# Patient Record
Sex: FEMALE | Race: OTHER | HISPANIC OR LATINO | URBAN - METROPOLITAN AREA
[De-identification: names, ages, dates, MRNs, and addresses within clinical notes are randomized per-mention and may not be internally consistent; named-entity substitution may affect disease eponyms.]

---

## 2017-08-12 ENCOUNTER — EMERGENCY (EMERGENCY)
Facility: HOSPITAL | Age: 82
LOS: 1 days | Discharge: HOME | End: 2017-08-12
Attending: EMERGENCY MEDICINE | Admitting: EMERGENCY MEDICINE
Payer: COMMERCIAL

## 2017-08-12 VITALS
HEIGHT: 56 IN | HEART RATE: 71 BPM | WEIGHT: 154.98 LBS | RESPIRATION RATE: 20 BRPM | SYSTOLIC BLOOD PRESSURE: 132 MMHG | DIASTOLIC BLOOD PRESSURE: 70 MMHG | OXYGEN SATURATION: 99 % | TEMPERATURE: 98 F

## 2017-08-12 DIAGNOSIS — Z79.2 LONG TERM (CURRENT) USE OF ANTIBIOTICS: ICD-10-CM

## 2017-08-12 DIAGNOSIS — M75.121 COMPLETE ROTATOR CUFF TEAR OR RUPTURE OF RIGHT SHOULDER, NOT SPECIFIED AS TRAUMATIC: Chronic | ICD-10-CM

## 2017-08-12 DIAGNOSIS — Z79.82 LONG TERM (CURRENT) USE OF ASPIRIN: ICD-10-CM

## 2017-08-12 DIAGNOSIS — Z90.710 ACQUIRED ABSENCE OF BOTH CERVIX AND UTERUS: Chronic | ICD-10-CM

## 2017-08-12 DIAGNOSIS — E78.5 HYPERLIPIDEMIA, UNSPECIFIED: ICD-10-CM

## 2017-08-12 DIAGNOSIS — I10 ESSENTIAL (PRIMARY) HYPERTENSION: ICD-10-CM

## 2017-08-12 DIAGNOSIS — B35.6 TINEA CRURIS: ICD-10-CM

## 2017-08-12 PROCEDURE — 99284 EMERGENCY DEPT VISIT MOD MDM: CPT

## 2017-08-12 PROCEDURE — 99283 EMERGENCY DEPT VISIT LOW MDM: CPT

## 2017-08-12 RX ORDER — NYSTATIN 500MM UNIT
10 POWDER (EA) MISCELLANEOUS
Qty: 1 | Refills: 0 | OUTPATIENT
Start: 2017-08-12 | End: 2017-08-26

## 2017-08-12 RX ORDER — MUPIROCIN 20 MG/G
1 OINTMENT TOPICAL
Qty: 1 | Refills: 0 | OUTPATIENT
Start: 2017-08-12 | End: 2017-08-26

## 2017-08-12 NOTE — ED PROVIDER NOTE - MEDICAL DECISION MAKING DETAILS
86 yr old female with hx of HTN, HLD and fungal infection presents to ed c/o rectal pruritis and pain due to frequent wiping and scratching causing excoriation since 2/2017.  pt seeing PMD and tried betaconazole, hydrocortisone without relieve.  no fever, no change in bowel, no n/v, no dysuria.      consistent with fungal infection of rectum with skin excoriation.  rx nystatin powder, bactroban.  f/u with pcp and derm.  left ambulatory

## 2017-08-12 NOTE — ED PROVIDER NOTE - PROGRESS NOTE DETAILS
[place]: findings consist with fungal infection with skin excoriation.  f/u with derm, rx nystatin powder to area tid x 14 days.  return instructions given.  encourage to avoid frequent scratching and wiping.  left ambulatory

## 2017-08-12 NOTE — ED PROVIDER NOTE - GASTROINTESTINAL, MLM
Abdomen soft, non-tender, no guarding. RECTUM- small external hemorrhoids, + skin excoriation without underlying cellulitis or yanira's. no abscess or blood at rectum.

## 2017-08-12 NOTE — ED PROVIDER NOTE - OBJECTIVE STATEMENT
86 yr old female with hx of HTN, HLD and fungal infection presents to ed c/o rectal pruritis and pain due to frequent wiping and scratching causing excoriation since 2/2017.  pt seeing PMD and tried betaconazole, hydrocortisone without relieve.  no fever, no change in bowel, no n/v, no dysuria.

## 2017-12-12 ENCOUNTER — EMERGENCY (EMERGENCY)
Facility: HOSPITAL | Age: 82
LOS: 1 days | Discharge: HOME | End: 2017-12-12
Attending: EMERGENCY MEDICINE
Payer: COMMERCIAL

## 2017-12-12 VITALS
HEIGHT: 56 IN | OXYGEN SATURATION: 98 % | WEIGHT: 154.98 LBS | DIASTOLIC BLOOD PRESSURE: 64 MMHG | SYSTOLIC BLOOD PRESSURE: 164 MMHG | TEMPERATURE: 98 F | RESPIRATION RATE: 20 BRPM | HEART RATE: 60 BPM

## 2017-12-12 DIAGNOSIS — Z90.710 ACQUIRED ABSENCE OF BOTH CERVIX AND UTERUS: Chronic | ICD-10-CM

## 2017-12-12 DIAGNOSIS — M75.121 COMPLETE ROTATOR CUFF TEAR OR RUPTURE OF RIGHT SHOULDER, NOT SPECIFIED AS TRAUMATIC: Chronic | ICD-10-CM

## 2017-12-12 LAB
APPEARANCE UR: CLEAR — SIGNIFICANT CHANGE UP
BACTERIA # UR AUTO: ABNORMAL /HPF
BILIRUB UR-MCNC: NEGATIVE — SIGNIFICANT CHANGE UP
COLOR SPEC: YELLOW — SIGNIFICANT CHANGE UP
DIFF PNL FLD: NEGATIVE — SIGNIFICANT CHANGE UP
EPI CELLS # UR: SIGNIFICANT CHANGE UP /HPF
GLUCOSE UR QL: NEGATIVE — SIGNIFICANT CHANGE UP
KETONES UR-MCNC: NEGATIVE — SIGNIFICANT CHANGE UP
LEUKOCYTE ESTERASE UR-ACNC: ABNORMAL
NITRITE UR-MCNC: NEGATIVE — SIGNIFICANT CHANGE UP
PH UR: 5 — SIGNIFICANT CHANGE UP (ref 5–8)
PROT UR-MCNC: NEGATIVE — SIGNIFICANT CHANGE UP
RBC CASTS # UR COMP ASSIST: SIGNIFICANT CHANGE UP /HPF (ref 0–2)
SP GR SPEC: 1.01 — SIGNIFICANT CHANGE UP (ref 1.01–1.02)
UROBILINOGEN FLD QL: NEGATIVE — SIGNIFICANT CHANGE UP
WBC UR QL: SIGNIFICANT CHANGE UP /HPF (ref 0–5)

## 2017-12-12 PROCEDURE — 81001 URINALYSIS AUTO W/SCOPE: CPT

## 2017-12-12 PROCEDURE — 99284 EMERGENCY DEPT VISIT MOD MDM: CPT

## 2017-12-12 PROCEDURE — 99283 EMERGENCY DEPT VISIT LOW MDM: CPT

## 2017-12-12 PROCEDURE — 87086 URINE CULTURE/COLONY COUNT: CPT

## 2017-12-12 RX ORDER — ESTRADIOL 4 UG/1
1 INSERT VAGINAL
Qty: 1 | Refills: 0 | OUTPATIENT
Start: 2017-12-12 | End: 2018-01-10

## 2017-12-12 RX ORDER — ESTROGENS, CONJUGATED 0.625 MG/G
1 CREAM WITH APPLICATOR VAGINAL
Qty: 1 | Refills: 0 | OUTPATIENT
Start: 2017-12-12 | End: 2018-01-01

## 2017-12-12 NOTE — ED PROVIDER NOTE - OBJECTIVE STATEMENT
85 y/o F pt with a significant PMHx of dyslipidemia, HTN and a significant PSHx of hysterectomy presents to the ED c/o vaginal itching and redness with burning urination x8 months. Pt reports that she thinks she has vaginal fungus. Pt states she saw her pmd in March with a normal exam; pt was given a cream which pt notes worsened pt's symptoms. Pt also reports intermittent LLQ pain x1 month; pt denies any current pain. Pt denies back pain, fever, chills vaginal discharge, sexual intercourse or any other complaints. NKDA.

## 2017-12-13 LAB
CULTURE RESULTS: SIGNIFICANT CHANGE UP
SPECIMEN SOURCE: SIGNIFICANT CHANGE UP

## 2017-12-18 ENCOUNTER — RESULT REVIEW (OUTPATIENT)
Age: 82
End: 2017-12-18

## 2018-09-06 ENCOUNTER — INPATIENT (INPATIENT)
Facility: HOSPITAL | Age: 83
LOS: 4 days | Discharge: HOME | DRG: 195 | End: 2018-09-11
Attending: INTERNAL MEDICINE | Admitting: INTERNAL MEDICINE
Payer: COMMERCIAL

## 2018-09-06 VITALS
WEIGHT: 154.98 LBS | HEIGHT: 56 IN | DIASTOLIC BLOOD PRESSURE: 70 MMHG | HEART RATE: 86 BPM | RESPIRATION RATE: 16 BRPM | TEMPERATURE: 100 F | OXYGEN SATURATION: 95 % | SYSTOLIC BLOOD PRESSURE: 140 MMHG

## 2018-09-06 DIAGNOSIS — J18.9 PNEUMONIA, UNSPECIFIED ORGANISM: ICD-10-CM

## 2018-09-06 DIAGNOSIS — Z29.9 ENCOUNTER FOR PROPHYLACTIC MEASURES, UNSPECIFIED: ICD-10-CM

## 2018-09-06 DIAGNOSIS — I10 ESSENTIAL (PRIMARY) HYPERTENSION: ICD-10-CM

## 2018-09-06 DIAGNOSIS — M75.121 COMPLETE ROTATOR CUFF TEAR OR RUPTURE OF RIGHT SHOULDER, NOT SPECIFIED AS TRAUMATIC: Chronic | ICD-10-CM

## 2018-09-06 DIAGNOSIS — G25.81 RESTLESS LEGS SYNDROME: ICD-10-CM

## 2018-09-06 DIAGNOSIS — E78.5 HYPERLIPIDEMIA, UNSPECIFIED: ICD-10-CM

## 2018-09-06 DIAGNOSIS — Z71.89 OTHER SPECIFIED COUNSELING: ICD-10-CM

## 2018-09-06 DIAGNOSIS — Z90.710 ACQUIRED ABSENCE OF BOTH CERVIX AND UTERUS: Chronic | ICD-10-CM

## 2018-09-06 LAB
ANION GAP SERPL CALC-SCNC: 7 MMOL/L — SIGNIFICANT CHANGE UP (ref 5–17)
BASOPHILS # BLD AUTO: 0 K/UL — SIGNIFICANT CHANGE UP (ref 0–0.2)
BASOPHILS NFR BLD AUTO: 0.4 % — SIGNIFICANT CHANGE UP (ref 0–2)
BUN SERPL-MCNC: 27 MG/DL — HIGH (ref 7–18)
CALCIUM SERPL-MCNC: 8.5 MG/DL — SIGNIFICANT CHANGE UP (ref 8.4–10.5)
CHLORIDE SERPL-SCNC: 112 MMOL/L — HIGH (ref 96–108)
CO2 SERPL-SCNC: 25 MMOL/L — SIGNIFICANT CHANGE UP (ref 22–31)
CREAT SERPL-MCNC: 0.93 MG/DL — SIGNIFICANT CHANGE UP (ref 0.5–1.3)
EOSINOPHIL # BLD AUTO: 0.1 K/UL — SIGNIFICANT CHANGE UP (ref 0–0.5)
EOSINOPHIL NFR BLD AUTO: 1 % — SIGNIFICANT CHANGE UP (ref 0–6)
GLUCOSE SERPL-MCNC: 105 MG/DL — HIGH (ref 70–99)
HCT VFR BLD CALC: 38.8 % — SIGNIFICANT CHANGE UP (ref 34.5–45)
HGB BLD-MCNC: 12.4 G/DL — SIGNIFICANT CHANGE UP (ref 11.5–15.5)
LACTATE SERPL-SCNC: 1.4 MMOL/L — SIGNIFICANT CHANGE UP (ref 0.7–2)
LYMPHOCYTES # BLD AUTO: 1.2 K/UL — SIGNIFICANT CHANGE UP (ref 1–3.3)
LYMPHOCYTES # BLD AUTO: 11.1 % — LOW (ref 13–44)
MCHC RBC-ENTMCNC: 30 PG — SIGNIFICANT CHANGE UP (ref 27–34)
MCHC RBC-ENTMCNC: 32.1 GM/DL — SIGNIFICANT CHANGE UP (ref 32–36)
MCV RBC AUTO: 93.6 FL — SIGNIFICANT CHANGE UP (ref 80–100)
MONOCYTES # BLD AUTO: 0.3 K/UL — SIGNIFICANT CHANGE UP (ref 0–0.9)
MONOCYTES NFR BLD AUTO: 2.8 % — SIGNIFICANT CHANGE UP (ref 2–14)
NEUTROPHILS # BLD AUTO: 9.3 K/UL — HIGH (ref 1.8–7.4)
NEUTROPHILS NFR BLD AUTO: 84.7 % — HIGH (ref 43–77)
NT-PROBNP SERPL-SCNC: 317 PG/ML — SIGNIFICANT CHANGE UP (ref 0–450)
PLATELET # BLD AUTO: 199 K/UL — SIGNIFICANT CHANGE UP (ref 150–400)
POTASSIUM SERPL-MCNC: 3.9 MMOL/L — SIGNIFICANT CHANGE UP (ref 3.5–5.3)
POTASSIUM SERPL-SCNC: 3.9 MMOL/L — SIGNIFICANT CHANGE UP (ref 3.5–5.3)
RAPID RVP RESULT: SIGNIFICANT CHANGE UP
RBC # BLD: 4.14 M/UL — SIGNIFICANT CHANGE UP (ref 3.8–5.2)
RBC # FLD: 13.6 % — SIGNIFICANT CHANGE UP (ref 10.3–14.5)
SODIUM SERPL-SCNC: 144 MMOL/L — SIGNIFICANT CHANGE UP (ref 135–145)
TROPONIN I SERPL-MCNC: <0.015 NG/ML — SIGNIFICANT CHANGE UP (ref 0–0.04)
WBC # BLD: 11 K/UL — HIGH (ref 3.8–10.5)
WBC # FLD AUTO: 11 K/UL — HIGH (ref 3.8–10.5)

## 2018-09-06 PROCEDURE — 99285 EMERGENCY DEPT VISIT HI MDM: CPT | Mod: 25

## 2018-09-06 PROCEDURE — 99223 1ST HOSP IP/OBS HIGH 75: CPT | Mod: GC

## 2018-09-06 PROCEDURE — 93010 ELECTROCARDIOGRAM REPORT: CPT

## 2018-09-06 PROCEDURE — 71045 X-RAY EXAM CHEST 1 VIEW: CPT | Mod: 26

## 2018-09-06 RX ORDER — AZITHROMYCIN 500 MG/1
500 TABLET, FILM COATED ORAL EVERY 24 HOURS
Qty: 0 | Refills: 0 | Status: DISCONTINUED | OUTPATIENT
Start: 2018-09-07 | End: 2018-09-11

## 2018-09-06 RX ORDER — OMEPRAZOLE 10 MG/1
1 CAPSULE, DELAYED RELEASE ORAL
Qty: 0 | Refills: 0 | COMMUNITY

## 2018-09-06 RX ORDER — PANTOPRAZOLE SODIUM 20 MG/1
40 TABLET, DELAYED RELEASE ORAL
Qty: 0 | Refills: 0 | Status: DISCONTINUED | OUTPATIENT
Start: 2018-09-06 | End: 2018-09-11

## 2018-09-06 RX ORDER — ATORVASTATIN CALCIUM 80 MG/1
10 TABLET, FILM COATED ORAL AT BEDTIME
Qty: 0 | Refills: 0 | Status: DISCONTINUED | OUTPATIENT
Start: 2018-09-06 | End: 2018-09-11

## 2018-09-06 RX ORDER — ROPINIROLE 8 MG/1
0.5 TABLET, FILM COATED, EXTENDED RELEASE ORAL DAILY
Qty: 0 | Refills: 0 | Status: DISCONTINUED | OUTPATIENT
Start: 2018-09-06 | End: 2018-09-11

## 2018-09-06 RX ORDER — ROPINIROLE 8 MG/1
1 TABLET, FILM COATED, EXTENDED RELEASE ORAL
Qty: 0 | Refills: 0 | COMMUNITY

## 2018-09-06 RX ORDER — CEFTRIAXONE 500 MG/1
1 INJECTION, POWDER, FOR SOLUTION INTRAMUSCULAR; INTRAVENOUS ONCE
Qty: 0 | Refills: 0 | Status: COMPLETED | OUTPATIENT
Start: 2018-09-06 | End: 2018-09-06

## 2018-09-06 RX ORDER — SODIUM CHLORIDE 9 MG/ML
3 INJECTION INTRAMUSCULAR; INTRAVENOUS; SUBCUTANEOUS ONCE
Qty: 0 | Refills: 0 | Status: COMPLETED | OUTPATIENT
Start: 2018-09-06 | End: 2018-09-06

## 2018-09-06 RX ORDER — ALBUTEROL 90 UG/1
2.5 AEROSOL, METERED ORAL ONCE
Qty: 0 | Refills: 0 | Status: COMPLETED | OUTPATIENT
Start: 2018-09-06 | End: 2018-09-06

## 2018-09-06 RX ORDER — AZITHROMYCIN 500 MG/1
500 TABLET, FILM COATED ORAL ONCE
Qty: 0 | Refills: 0 | Status: COMPLETED | OUTPATIENT
Start: 2018-09-06 | End: 2018-09-06

## 2018-09-06 RX ORDER — INFLUENZA VIRUS VACCINE 15; 15; 15; 15 UG/.5ML; UG/.5ML; UG/.5ML; UG/.5ML
0.5 SUSPENSION INTRAMUSCULAR ONCE
Qty: 0 | Refills: 0 | Status: COMPLETED | OUTPATIENT
Start: 2018-09-06 | End: 2018-09-11

## 2018-09-06 RX ORDER — ACETAMINOPHEN 500 MG
650 TABLET ORAL ONCE
Qty: 0 | Refills: 0 | Status: COMPLETED | OUTPATIENT
Start: 2018-09-06 | End: 2018-09-06

## 2018-09-06 RX ORDER — ENOXAPARIN SODIUM 100 MG/ML
40 INJECTION SUBCUTANEOUS DAILY
Qty: 0 | Refills: 0 | Status: DISCONTINUED | OUTPATIENT
Start: 2018-09-06 | End: 2018-09-11

## 2018-09-06 RX ORDER — CEFTRIAXONE 500 MG/1
1 INJECTION, POWDER, FOR SOLUTION INTRAMUSCULAR; INTRAVENOUS EVERY 24 HOURS
Qty: 0 | Refills: 0 | Status: DISCONTINUED | OUTPATIENT
Start: 2018-09-07 | End: 2018-09-11

## 2018-09-06 RX ADMIN — SODIUM CHLORIDE 3 MILLILITER(S): 9 INJECTION INTRAMUSCULAR; INTRAVENOUS; SUBCUTANEOUS at 07:42

## 2018-09-06 RX ADMIN — AZITHROMYCIN 250 MILLIGRAM(S): 500 TABLET, FILM COATED ORAL at 08:22

## 2018-09-06 RX ADMIN — ROPINIROLE 0.5 MILLIGRAM(S): 8 TABLET, FILM COATED, EXTENDED RELEASE ORAL at 12:49

## 2018-09-06 RX ADMIN — Medication 650 MILLIGRAM(S): at 08:22

## 2018-09-06 RX ADMIN — ALBUTEROL 2.5 MILLIGRAM(S): 90 AEROSOL, METERED ORAL at 08:22

## 2018-09-06 RX ADMIN — CEFTRIAXONE 100 GRAM(S): 500 INJECTION, POWDER, FOR SOLUTION INTRAMUSCULAR; INTRAVENOUS at 08:23

## 2018-09-06 RX ADMIN — ATORVASTATIN CALCIUM 10 MILLIGRAM(S): 80 TABLET, FILM COATED ORAL at 22:06

## 2018-09-06 RX ADMIN — ENOXAPARIN SODIUM 40 MILLIGRAM(S): 100 INJECTION SUBCUTANEOUS at 11:36

## 2018-09-06 NOTE — H&P ADULT - HISTORY OF PRESENT ILLNESS
87F walks independent lives in daughter Amirah([phone]) in family house with PMH of restless leg syndrome (on Ropinirole), HTN (was on lisinopril which was discontinued likely 2/2 chronic dry cough), HLD presented with SOB and COLVIN started this morning. Patient was fine last night but she woke up around 5 as she was not feeling good. When she tried to get up form bed she started getting SOB, so she went to her daughter. When daughter saw her she was gasping for air so she called ambulance. As per daughter by the time ambulance came her respiration significantly improved. She reports having dry cough for last 6 months and lisinopril was likely stopped for same. Denies chest pain, abdominal pain, palpitations, dizziness, N/V/D, headache.

## 2018-09-06 NOTE — ED ADULT NURSE NOTE - OBJECTIVE STATEMENT
Pt is aox3 came to er accompanied by her daughter via ems. with c/o sob and cough. as per daughter pt sob was worse at midnight but is much better now. pt was seen by Attending, labs pending. culture and lactate also pending.

## 2018-09-06 NOTE — H&P ADULT - PROBLEM SELECTOR PLAN 5
IMPROVE VTE Individual Risk Assessment          RISK                                                          Points  [  ] Previous VTE                                                3  [  ] Thrombophilia                                             2  [  ] Lower limb paralysis                                   2        (unable to hold up >15 seconds)    [  ] Current Cancer                                             2         (within 6 months)  [ x ] Immobilization > 24 hrs                              1  [  ] ICU/CCU stay > 24 hours                             1  [ x ] Age > 60                                                         1    IMPROVE VTE Score: 2  will start on lovenox   no need for GI prophylaxis

## 2018-09-06 NOTE — H&P ADULT - NSHPSOCIALHISTORY_GEN_ALL_CORE
Denies use of alcohol, cigarettes and recreational drugs. Denies use of alcohol and recreational drugs.  smokes for few year - quit 50 years ago  lives in family home with daughter Amirah  walks independently

## 2018-09-06 NOTE — H&P ADULT - PROBLEM SELECTOR PLAN 2
was on lisinopril   which was taken off 2/2 cough   will continue monitoring it - add medication if needed

## 2018-09-06 NOTE — PATIENT PROFILE ADULT. - LANGUAGE ASSISTANCE NEEDED
SPEAKS SOME English. Understands to request  as needed/No-Patient/Caregiver offered and refused free interpretation services.

## 2018-09-06 NOTE — H&P ADULT - PROBLEM SELECTOR PLAN 4
IMPROVE VTE Individual Risk Assessment          RISK                                                          Points  [  ] Previous VTE                                                3  [  ] Thrombophilia                                             2  [  ] Lower limb paralysis                                   2        (unable to hold up >15 seconds)    [  ] Current Cancer                                             2         (within 6 months)  [ x ] Immobilization > 24 hrs                              1  [  ] ICU/CCU stay > 24 hours                             1  [ x ] Age > 60                                                         1    IMPROVE VTE Score: 2  will start on lovenox   no need for GI prophylaxis continue home medication ropinirole

## 2018-09-06 NOTE — H&P ADULT - ASSESSMENT
87F walks independent lives in daughter Amirah([phone]) in family house with PMH of restless leg syndrome (on Ropinirole), HTN (was on lisinopril which was discontinued likely 2/2 chronic dry cough), HLD presented with SOB and COLVIN started this morning. Patient was fine last night but she woke up around 5 as she was not feeling good. When she tried to get up form bed she started getting SOB, so she went to her daughter. When daughter saw her she was gasping for air so she called ambulance. As per daughter by the time ambulance came her respiration significantly improved. She reports having dry cough for last 6 months and lisinopril was likely stopped for same. Denies chest pain, abdominal pain, palpitations, dizziness, N/V/D, headache.      In ED: vitals stable  labs pertinent for WBC 11.0  stat dose of rocephin and zithromax was given   supportive care with tylenol and duoneb   EKG NSR -no ST-T wave changes   Chest Xray: right upper and base infiltrate

## 2018-09-06 NOTE — ED ADULT TRIAGE NOTE - CHIEF COMPLAINT QUOTE
BIBA c/o cough and shortness of breath since midnight, pt's daughter states pt feels warm and was more in distress last night but presently breathing at normal rate

## 2018-09-06 NOTE — H&P ADULT - ATTENDING COMMENTS
Patient seen/evaluated at bedside in ED holding 9/6/2018. I agree with the resident h&P note/outlined plan of care. My independent findings and conclusions are documented.    Briefly, this is y/o female who lives with daughters presents with  Pt denies weight loss, night sweats, recent travel, hemoptysis, nausea/vomiting. (+) fatigue     vials reviewed  AAOx3 NAD lying comfortably flat in bed  neck supple  S1S2 RRR  CTAb/l no accessory muscle use  soft NT, ND, + BS   trace b/l lower extremity edema  no cyanosis/clubbing    1. PNA  somewhat atypical symptoms in light of prolonged history o  though there may have been Patient seen/evaluated at bedside in ED holding 9/6/2018. I agree with the resident h&P note/outlined plan of care. My independent findings and conclusions are documented.    Briefly, this is y/o female who lives with daughters presents with  Pt denies weight loss, night sweats, recent travel, hemoptysis, nausea/vomiting. (+) fatigue     pmhx/meds/all/ros/famhx/sochx as per details of the H&P    vials reviewed  AAOx3 NAD lying comfortably flat in bed  neck supple  S1S2 RRR  right sided rhonchi, no accessory muscle use  soft NT, ND, + BS   trace b/l lower extremity edema  no cyanosis/clubbing    1. PNA: RuL/RLL infiltrate  somewhat atypical symptoms in light of prolonged history of cough though there may have been a potential drug effect. No weight loss, night sweats/hemoptysis/ recent travel, chest/back pain. Smoking history was minimal and remote. 02 saturations are appropriate  -ceftriaxone/azithromycin  -quantiferon gold  -send procalcitonin  -supportive management w/ anti-tussive  -RVP negative, urine legionella    2. HTN  3. Restless leg syndrome  4. HLD    c/w ropinerole/statin

## 2018-09-06 NOTE — H&P ADULT - PROBLEM SELECTOR PLAN 1
presented with chronic cough and acute SOB/COLVIN starting this AM   chest xray : right upper lobe and base pneumonia   P/E: diffuse rhonchi over right side   follow blood culture, legionella antigen and RVP  stat dose of rocphin and zithromax in ED presented with chronic cough and acute SOB/COLVIN starting this AM   chest xray : right upper lobe and base pneumonia   P/E: diffuse rhonchi over right side   follow blood culture, legionella antigen and RVP  stat dose of rocphin and zithromax in ED  will continue with rocphin and zithromax

## 2018-09-06 NOTE — ED PROVIDER NOTE - MEDICAL DECISION MAKING DETAILS
86 yo F with left sided infiltrate persistent coughing, shortness of breath  and now febrile. MAR endorsed. Dr. Carroll was paged. Pt agrees with admission. I had a detailed discussion with the patient and/or guardian regarding the historical points, exam findings, and any diagnostic results supporting the admit diagnosis.

## 2018-09-06 NOTE — ED PROVIDER NOTE - OBJECTIVE STATEMENT
Pt with constant coughing x 6 months, +SOB last night, + exertional dyspnea,, no chest pain.  No reported fever.  Meds Hydroxyzine, Lovastatin, Antivert, Omeprazole, Ropinirole.

## 2018-09-06 NOTE — H&P ADULT - NSHPPHYSICALEXAM_GEN_ALL_CORE
· Constitutional	Well-developed, well nourished  · Eyes	EOMI; PERRL; no drainage or redness  · ENMT	No oral lesions; no gross abnormalities  · Neck	No bruits; no thyromegaly or nodules   · Back	No deformity or limitation of movement   · Respiratory	Breath Sounds equal & clear to percussion & auscultation, no accessory muscle use  · Cardiovascular	Regular rate & rhythm, normal S1, S2; no murmurs, gallops or rubs; no S3, S4  · Gastrointestinal	Soft, non-tender, no hepatosplenomegaly, normal bowel sounds  · Extremities	No cyanosis, clubbing or edema   · Neurological	Alert & oriented; no sensory, motor or coordination deficits, normal reflexes  · Musculoskeletal	No joint pain, swelling or deformity; no limitation of movement · Constitutional	Well-developed, well nourished  · Eyes	EOMI; PERRL; no drainage or redness  · ENMT	No oral lesions; no gross abnormalities  · Neck	No bruits; no thyromegaly or nodules   · Back	No deformity or limitation of movement   · Respiratory	right lobe diffuse rhonchi noted clear on auscultation on left side   · Cardiovascular	Regular rate & rhythm, normal S1, S2; no murmurs, gallops or rubs; no S3, S4  · Gastrointestinal	Soft, non-tender, no hepatosplenomegaly, normal bowel sounds  · Extremities	No cyanosis, clubbing or edema   · Neurological	Alert & oriented; no sensory, motor or coordination deficits, normal reflexes  · Musculoskeletal	No joint pain, swelling or deformity; no limitation of movement

## 2018-09-06 NOTE — H&P ADULT - NSHPLABSRESULTS_GEN_ALL_CORE
ICU Vital Signs Last 24 Hrs  T(C): 36.9 (06 Sep 2018 10:48), Max: 37.8 (06 Sep 2018 07:42)  T(F): 98.4 (06 Sep 2018 10:48), Max: 100.1 (06 Sep 2018 07:42)  HR: 68 (06 Sep 2018 10:48) (68 - 86)  BP: 129/58 (06 Sep 2018 10:48) (129/58 - 182/83)  RR: 22 (06 Sep 2018 10:48) (16 - 22)  SpO2: 96% (06 Sep 2018 10:48) (95% - 100%)                              12.4   11.0  )-----------( 199      ( 06 Sep 2018 07:09 )             38.8       09-06    144  |  112<H>  |  27<H>  ----------------------------<  105<H>  3.9   |  25  |  0.93    Ca    8.5      06 Sep 2018 07:09    Lactate Trend  09-06 @ 07:09 Lactate:1.4       CARDIAC MARKERS ( 06 Sep 2018 07:09 )  <0.015 ng/mL / x     / x     / x     / x          CAPILLARY BLOOD GLUCOSE      POCT Blood Glucose.: 99 mg/dL (06 Sep 2018 06:49)

## 2018-09-06 NOTE — ED ADULT NURSE NOTE - NSIMPLEMENTINTERV_GEN_ALL_ED
Implemented All Universal Safety Interventions:  [place] to call system. Call bell, personal items and telephone within reach. Instruct patient to call for assistance. Room bathroom lighting operational. Non-slip footwear when patient is off stretcher. Physically safe environment: no spills, clutter or unnecessary equipment. Stretcher in lowest position, wheels locked, appropriate side rails in place.

## 2018-09-07 LAB
24R-OH-CALCIDIOL SERPL-MCNC: 27.3 NG/ML — LOW (ref 30–80)
ANION GAP SERPL CALC-SCNC: 8 MMOL/L — SIGNIFICANT CHANGE UP (ref 5–17)
BASOPHILS # BLD AUTO: 0 K/UL — SIGNIFICANT CHANGE UP (ref 0–0.2)
BASOPHILS NFR BLD AUTO: 0.5 % — SIGNIFICANT CHANGE UP (ref 0–2)
BUN SERPL-MCNC: 15 MG/DL — SIGNIFICANT CHANGE UP (ref 7–18)
CALCIUM SERPL-MCNC: 8.2 MG/DL — LOW (ref 8.4–10.5)
CHLORIDE SERPL-SCNC: 113 MMOL/L — HIGH (ref 96–108)
CHOLEST SERPL-MCNC: 139 MG/DL — SIGNIFICANT CHANGE UP (ref 10–199)
CO2 SERPL-SCNC: 23 MMOL/L — SIGNIFICANT CHANGE UP (ref 22–31)
CREAT SERPL-MCNC: 0.82 MG/DL — SIGNIFICANT CHANGE UP (ref 0.5–1.3)
EOSINOPHIL # BLD AUTO: 0.3 K/UL — SIGNIFICANT CHANGE UP (ref 0–0.5)
EOSINOPHIL NFR BLD AUTO: 3.2 % — SIGNIFICANT CHANGE UP (ref 0–6)
FOLATE SERPL-MCNC: >20 NG/ML — SIGNIFICANT CHANGE UP
GLUCOSE SERPL-MCNC: 81 MG/DL — SIGNIFICANT CHANGE UP (ref 70–99)
HBA1C BLD-MCNC: 5.3 % — SIGNIFICANT CHANGE UP (ref 4–5.6)
HCT VFR BLD CALC: 32.7 % — LOW (ref 34.5–45)
HDLC SERPL-MCNC: 49 MG/DL — LOW
HGB BLD-MCNC: 10.3 G/DL — LOW (ref 11.5–15.5)
LEGIONELLA AG UR QL: NEGATIVE — SIGNIFICANT CHANGE UP
LIPID PNL WITH DIRECT LDL SERPL: 75 MG/DL — SIGNIFICANT CHANGE UP
LYMPHOCYTES # BLD AUTO: 2.3 K/UL — SIGNIFICANT CHANGE UP (ref 1–3.3)
LYMPHOCYTES # BLD AUTO: 22 % — SIGNIFICANT CHANGE UP (ref 13–44)
MAGNESIUM SERPL-MCNC: 2.1 MG/DL — SIGNIFICANT CHANGE UP (ref 1.6–2.6)
MCHC RBC-ENTMCNC: 29.3 PG — SIGNIFICANT CHANGE UP (ref 27–34)
MCHC RBC-ENTMCNC: 31.6 GM/DL — LOW (ref 32–36)
MCV RBC AUTO: 92.8 FL — SIGNIFICANT CHANGE UP (ref 80–100)
MONOCYTES # BLD AUTO: 0.4 K/UL — SIGNIFICANT CHANGE UP (ref 0–0.9)
MONOCYTES NFR BLD AUTO: 4 % — SIGNIFICANT CHANGE UP (ref 2–14)
NEUTROPHILS # BLD AUTO: 7.2 K/UL — SIGNIFICANT CHANGE UP (ref 1.8–7.4)
NEUTROPHILS NFR BLD AUTO: 70.4 % — SIGNIFICANT CHANGE UP (ref 43–77)
PHOSPHATE SERPL-MCNC: 2.5 MG/DL — SIGNIFICANT CHANGE UP (ref 2.5–4.5)
PLATELET # BLD AUTO: 164 K/UL — SIGNIFICANT CHANGE UP (ref 150–400)
POTASSIUM SERPL-MCNC: 3.5 MMOL/L — SIGNIFICANT CHANGE UP (ref 3.5–5.3)
POTASSIUM SERPL-SCNC: 3.5 MMOL/L — SIGNIFICANT CHANGE UP (ref 3.5–5.3)
PROCALCITONIN SERPL-MCNC: 0.9 NG/ML — HIGH (ref 0.02–0.1)
RBC # BLD: 3.53 M/UL — LOW (ref 3.8–5.2)
RBC # FLD: 13.2 % — SIGNIFICANT CHANGE UP (ref 10.3–14.5)
SODIUM SERPL-SCNC: 144 MMOL/L — SIGNIFICANT CHANGE UP (ref 135–145)
TOTAL CHOLESTEROL/HDL RATIO MEASUREMENT: 2.8 RATIO — LOW (ref 3.3–7.1)
TRIGL SERPL-MCNC: 76 MG/DL — SIGNIFICANT CHANGE UP (ref 10–149)
TSH SERPL-MCNC: 0.66 UU/ML — SIGNIFICANT CHANGE UP (ref 0.34–4.82)
VIT B12 SERPL-MCNC: 554 PG/ML — SIGNIFICANT CHANGE UP (ref 232–1245)
WBC # BLD: 10.3 K/UL — SIGNIFICANT CHANGE UP (ref 3.8–10.5)
WBC # FLD AUTO: 10.3 K/UL — SIGNIFICANT CHANGE UP (ref 3.8–10.5)

## 2018-09-07 PROCEDURE — 99223 1ST HOSP IP/OBS HIGH 75: CPT

## 2018-09-07 PROCEDURE — 99233 SBSQ HOSP IP/OBS HIGH 50: CPT | Mod: GC

## 2018-09-07 RX ADMIN — ROPINIROLE 0.5 MILLIGRAM(S): 8 TABLET, FILM COATED, EXTENDED RELEASE ORAL at 11:43

## 2018-09-07 RX ADMIN — AZITHROMYCIN 250 MILLIGRAM(S): 500 TABLET, FILM COATED ORAL at 06:21

## 2018-09-07 RX ADMIN — ENOXAPARIN SODIUM 40 MILLIGRAM(S): 100 INJECTION SUBCUTANEOUS at 11:43

## 2018-09-07 RX ADMIN — PANTOPRAZOLE SODIUM 40 MILLIGRAM(S): 20 TABLET, DELAYED RELEASE ORAL at 06:22

## 2018-09-07 RX ADMIN — ATORVASTATIN CALCIUM 10 MILLIGRAM(S): 80 TABLET, FILM COATED ORAL at 22:00

## 2018-09-07 RX ADMIN — CEFTRIAXONE 100 GRAM(S): 500 INJECTION, POWDER, FOR SOLUTION INTRAMUSCULAR; INTRAVENOUS at 06:21

## 2018-09-07 NOTE — PROGRESS NOTE ADULT - PROBLEM SELECTOR PLAN 3
follow lipid profile   DASH diet   continue lovastatin 20 mg (atorvastatin 10 mg) lipid profile - decreased HDL  DASH diet   continue lovastatin 20 mg (atorvastatin 10 mg)

## 2018-09-07 NOTE — PROGRESS NOTE ADULT - SUBJECTIVE AND OBJECTIVE BOX
PGY1 Note discussed with Supervising Resident and Primary Attending.    Patient is a 87y old  Female who presents with a chief complaint of cough for last 6 months (06 Sep 2018 10:59)      INTERVAL HPI/OVERNIGHT EVENTS :  No acute overnight events. Patient seen and examined at bedside. Patient has no current complaints. Patient denies nausea, vomiting, diarrhea, chest pain, SOB, headache.  MEDICATIONS  (STANDING):  atorvastatin 10 milliGRAM(s) Oral at bedtime  azithromycin  IVPB 500 milliGRAM(s) IV Intermittent every 24 hours  cefTRIAXone   IVPB 1 Gram(s) IV Intermittent every 24 hours  enoxaparin Injectable 40 milliGRAM(s) SubCutaneous daily  influenza   Vaccine 0.5 milliLiter(s) IntraMuscular once  pantoprazole    Tablet 40 milliGRAM(s) Oral before breakfast  rOPINIRole 0.5 milliGRAM(s) Oral daily    MEDICATIONS  (PRN):      Allergies    No Known Allergies    Intolerances        REVIEW OF SYSTEMS :  * General: denies chills, fatigue  * Eyes: denies vision changes  * Respiratory: denies cough, SOB, wheezing  * Cardio: denies chest pain, palpitations  * GI: denies abd pain, nausea, vomiting, diarrhea  * : denies dysuria  * Neuro: denies headaches, numbness, weakness  * MSK: denies joint pain  * Skin: denies itching, rashes    Vital Signs Last 24 Hrs  T(C): 37.2 (07 Sep 2018 05:19), Max: 37.5 (06 Sep 2018 16:36)  T(F): 98.9 (07 Sep 2018 05:19), Max: 99.5 (06 Sep 2018 16:36)  HR: 74 (07 Sep 2018 05:19) (56 - 80)  BP: 113/44 (07 Sep 2018 05:19) (111/53 - 129/58)  BP(mean): --  RR: 16 (07 Sep 2018 05:19) (16 - 22)  SpO2: 96% (07 Sep 2018 05:19) (96% - 97%)    PHYSICAL EXAM :  * General: no acute distress, well-nourished, well-developed  * HEENT: atraumatic, normocephalic; moist mucus membranes; supple neck; no JVD  * CN: EOMI, PERRL  * Respiratory: cta b/l; no wheezes, rales, rhonchi  * Cardio: RRR; no appreciable murmurs, rubs, gallops  * Abd: soft, nontender, nondistended, +BS  * Neuro: AAOx3; strength 5/5; sensation intact throughout  * Extremities: no clubbing, cyanosis, edema  * Skin: no rashes    LABS:                          12.4   11.0  )-----------( 199      ( 06 Sep 2018 07:09 )             38.8     09-06    144  |  112<H>  |  27<H>  ----------------------------<  105<H>  3.9   |  25  |  0.93    Ca    8.5      06 Sep 2018 07:09          CAPILLARY BLOOD GLUCOSE          RADIOLOGY & ADDITIONAL TESTS:   no new imaging    Imaging Personally Reviewed:    Consultant(s) Notes Reviewed: PGY1 Note discussed with Supervising Resident and Primary Attending.    Patient is a 87y old  Female who presents with a chief complaint of cough for last 6 months (06 Sep 2018 10:59)      INTERVAL HPI/OVERNIGHT EVENTS :  No acute overnight events. Patient seen and examined at bedside. Patient reporting cough without sputum, SOB. Patient denies chills, nausea, vomiting, diarrhea, chest pain, headache.    MEDICATIONS  (STANDING):  atorvastatin 10 milliGRAM(s) Oral at bedtime  azithromycin  IVPB 500 milliGRAM(s) IV Intermittent every 24 hours  cefTRIAXone   IVPB 1 Gram(s) IV Intermittent every 24 hours  enoxaparin Injectable 40 milliGRAM(s) SubCutaneous daily  influenza   Vaccine 0.5 milliLiter(s) IntraMuscular once  pantoprazole    Tablet 40 milliGRAM(s) Oral before breakfast  rOPINIRole 0.5 milliGRAM(s) Oral daily    MEDICATIONS  (PRN):      Allergies    No Known Allergies    Intolerances        REVIEW OF SYSTEMS :  * General: denies chills, fatigue  * Eyes: denies vision changes  * Respiratory: reports cough, SOB; denies wheezing  * Cardio: denies chest pain, palpitations  * GI: denies abd pain, nausea, vomiting, diarrhea  * : denies dysuria  * Neuro: denies headaches, numbness, weakness  * MSK: denies joint pain  * Skin: denies itching, rashes    Vital Signs Last 24 Hrs  T(C): 37.2 (07 Sep 2018 05:19), Max: 37.5 (06 Sep 2018 16:36)  T(F): 98.9 (07 Sep 2018 05:19), Max: 99.5 (06 Sep 2018 16:36)  HR: 74 (07 Sep 2018 05:19) (56 - 80)  BP: 113/44 (07 Sep 2018 05:19) (111/53 - 129/58)  BP(mean): --  RR: 16 (07 Sep 2018 05:19) (16 - 22)  SpO2: 96% (07 Sep 2018 05:19) (96% - 97%)    PHYSICAL EXAM :  * General: no acute distress, well-nourished, well-developed  * HEENT: atraumatic, normocephalic; moist mucus membranes; supple neck; no JVD  * CN: EOMI, PERRL  * Respiratory: diffuse rhonchi b/l, greater on right; no wheezes or rales  * Cardio: RRR; no appreciable murmurs, rubs, gallops  * Abd: soft, nontender, nondistended, +BS  * Neuro: AAOx3; strength 5/5; sensation intact throughout  * Extremities: no clubbing, cyanosis, edema  * Skin: no rashes    LABS:                          12.4   11.0  )-----------( 199      ( 06 Sep 2018 07:09 )             38.8     09-06    144  |  112<H>  |  27<H>  ----------------------------<  105<H>  3.9   |  25  |  0.93    Ca    8.5      06 Sep 2018 07:09          CAPILLARY BLOOD GLUCOSE          RADIOLOGY & ADDITIONAL TESTS:   cxr - RUL and RLL infiltrate    Imaging Personally Reviewed: yes    Consultant(s) Notes Reviewed: none

## 2018-09-07 NOTE — CONSULT NOTE ADULT - SUBJECTIVE AND OBJECTIVE BOX
Source:    Reason for Consultation:    Chief Complaint:    HPI:          MEDICATIONS  (STANDING):  atorvastatin 10 milliGRAM(s) Oral at bedtime  azithromycin  IVPB 500 milliGRAM(s) IV Intermittent every 24 hours  cefTRIAXone   IVPB 1 Gram(s) IV Intermittent every 24 hours  enoxaparin Injectable 40 milliGRAM(s) SubCutaneous daily  influenza   Vaccine 0.5 milliLiter(s) IntraMuscular once  pantoprazole    Tablet 40 milliGRAM(s) Oral before breakfast  rOPINIRole 0.5 milliGRAM(s) Oral daily    MEDICATIONS  (PRN):      Allergies    No Known Allergies    Intolerances        PAST MEDICAL & SURGICAL HISTORY:  Restless leg syndrome  Hypertension  Dyslipidemia  Complete rotator cuff tear, right: repair  History of hysterectomy      FAMILY HISTORY:  No pertinent family history in first degree relatives      SOCIAL HISTORY  Smoking History:   Alcohol:  Drugs:  Occupation:    T(C): 37.2 (09-07-18 @ 05:19), Max: 37.5 (09-06-18 @ 16:36)  HR: 74 (09-07-18 @ 05:19) (56 - 80)  BP: 113/44 (09-07-18 @ 05:19) (111/53 - 128/68)  RR: 16 (09-07-18 @ 05:19) (16 - 20)  SpO2: 96% (09-07-18 @ 05:19) (96% - 97%)    LABS:                        10.3   10.3  )-----------( 164      ( 07 Sep 2018 07:14 )             32.7     09-07    144  |  113<H>  |  15  ----------------------------<  81  3.5   |  23  |  0.82    Ca    8.2<L>      07 Sep 2018 07:14  Phos  2.5     09-07  Mg     2.1     09-07    CARDIAC MARKERS ( 06 Sep 2018 07:09 )  <0.015 ng/mL / x     / x     / x     / x        Serum Pro-Brain Natriuretic Peptide: 317 pg/mL (09-06-18 @ 07:09)    Microbiology  Culture Results:   No growth to date. (09-06 @ 10:10)  Culture Results:   No growth to date. (09-06 @ 10:10)    RADIOLOGY & ADDITIONAL STUDIES: (My Reading)    CXR- Portable film- Right upper lobe infiltrate with air bronchograms Source: Patient and chart    Reason for Consultation: Cough and pneumonia    Chief Complaint: Chronic cough x 6 months, worsening shortness of breath x 2 days    HPI:  87F with HTN and dyslipidemia who presented to the hospital with worsening shortness of breath for 2 days. She has been having a chronic nonproductive cough over the last 6 months with no change in her symptoms. She was on an ACE I which was discontinued previously. She has subjective fever and chills, +night sweats. No weightloss or anorexia. No sick contacts. She has is very active and does all of her own ADLs. She lives in her own apartment level by herself but her daughters live on the other floors. She has no sick contacts. She has been in the [place] for over 60 years and is original from [place] where she goes back to every year. 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 She has noticed a subtle increase in shortness of breath on exertion when she walks. She denies any chest pain, orthopnea or PND. She has occasional left sided rib pain from coughing. She has no nausea, vomiting, or diarrhea.    MEDICATIONS  (STANDING):  atorvastatin 10 milliGRAM(s) Oral at bedtime  azithromycin  IVPB 500 milliGRAM(s) IV Intermittent every 24 hours  cefTRIAXone   IVPB 1 Gram(s) IV Intermittent every 24 hours  enoxaparin Injectable 40 milliGRAM(s) SubCutaneous daily  influenza   Vaccine 0.5 milliLiter(s) IntraMuscular once  pantoprazole    Tablet 40 milliGRAM(s) Oral before breakfast  rOPINIRole 0.5 milliGRAM(s) Oral daily    MEDICATIONS  (PRN):    Allergies    No Known Allergies    Intolerances    PAST MEDICAL & SURGICAL HISTORY:  Restless leg syndrome  Hypertension  Dyslipidemia  Complete rotator cuff tear, right: repair  History of hysterectomy    FAMILY HISTORY:  No pertinent family history in first degree relatives    SOCIAL HISTORY  Smoking History: Smoked less than 1 ppd ~12 years, quit over 50 years ago  Alcohol: No ETOH  Drugs: No Drugs  Occupation: Worked cleaning airplanes at [employer]    T(C): 37.2 (09-07-18 @ 05:19), Max: 37.5 (09-06-18 @ 16:36)  HR: 74 (09-07-18 @ 05:19) (56 - 80)  BP: 113/44 (09-07-18 @ 05:19) (111/53 - 128/68)  RR: 16 (09-07-18 @ 05:19) (16 - 20)  SpO2: 96% (09-07-18 @ 05:19) (96% - 97%)    LABS:                        10.3   10.3  )-----------( 164      ( 07 Sep 2018 07:14 )             32.7     09-07    144  |  113<H>  |  15  ----------------------------<  81  3.5   |  23  |  0.82    Ca    8.2<L>      07 Sep 2018 07:14  Phos  2.5     09-07  Mg     2.1     09-07    CARDIAC MARKERS ( 06 Sep 2018 07:09 )  <0.015 ng/mL / x     / x     / x     / x        Serum Pro-Brain Natriuretic Peptide: 317 pg/mL (09-06-18 @ 07:09)    Microbiology  Culture Results:   No growth to date. (09-06 @ 10:10)  Culture Results:   No growth to date. (09-06 @ 10:10)    RADIOLOGY & ADDITIONAL STUDIES: (My Reading)    CXR- Portable film- Right upper lobe infiltrate with air bronchograms. The patient is rotated left shoulder forward. Some possible volume loss in the RUL with tracheal deviation.

## 2018-09-07 NOTE — PROGRESS NOTE ADULT - ASSESSMENT
87F walks independent lives in daughter Amirah([phone]) in family house with PMH of restless leg syndrome (on Ropinirole), HTN (was on lisinopril which was discontinued likely 2/2 chronic dry cough), HLD presented with SOB and COLVIN started this morning. Patient was fine last night but she woke up around 5 as she was not feeling good. When she tried to get up form bed she started getting SOB, so she went to her daughter. When daughter saw her she was gasping for air so she called ambulance. As per daughter by the time ambulance came her respiration significantly improved. She reports having dry cough for last 6 months and lisinopril was likely stopped for same. Denies chest pain, abdominal pain, palpitations, dizziness, N/V/D, headache.      In ED: vitals stable  labs pertinent for WBC 11.0  stat dose of rocephin and zithromax was given   supportive care with tylenol and duoneb   EKG NSR -no ST-T wave changes   Chest Xray: right upper and base infiltrate 87F walks independent lives in daughter Amirah([phone]) in family house with PMH of restless leg syndrome (on Ropinirole), HTN (was on lisinopril which was discontinued likely 2/2 chronic dry cough), HLD presented with SOB and COLVIN of one day.

## 2018-09-07 NOTE — PROGRESS NOTE ADULT - PROBLEM SELECTOR PLAN 2
was on lisinopril   which was taken off 2/2 cough   will continue monitoring it - add medication if needed was on lisinopril which was taken off 2/2 cough   will continue monitoring - add medication if needed

## 2018-09-07 NOTE — CONSULT NOTE ADULT - ASSESSMENT
Impression:    1) Right upper lobe pneumonia- DDx- Community acquired pneumonia, MTB, Atypical pneumonia  2) Chronic cough with night sweats and chills      Recommendations  Given her chronic cough with night sweats and right upper lobe infiltrate , would be concerned about tuberculosis even though [place] risk [place]. Given increased suspicion, would consider respiratory isolation.  Continue abx for community acquired pneumonia  Would avoid Fluoroquinolone use until TB is ruled out  Likely will need induced sputum by respiratory therapy with hypertonic saline  Agree with Quantiferon and CT chest non contrast to further evaluate for other evidence of tuberculosis  Will follow after CT chest.  D/W Dr Barcenas Impression:    1) Right upper lobe pneumonia- DDx- Community acquired pneumonia, MTB, Atypical pneumonia  2) Chronic cough with night sweats and chills      Recommendations  Given her chronic cough with night sweats and right upper lobe infiltrate , would be concerned about tuberculosis even though [place] risk [place]. Given increased suspicion, would consider respiratory isolation.  Continue abx for community acquired pneumonia, complete 5-7 days depending on clinical course  Dyspnea on exertion possibly from subacute or chronic pneumonia  Would avoid Fluoroquinolone use until TB is ruled out  Likely will need induced sputum by respiratory therapy with hypertonic saline  Agree with Quantiferon and CT chest non contrast to further evaluate for other evidence of tuberculosis  Will follow after CT chest.  D/W Dr Barcenas

## 2018-09-07 NOTE — PROGRESS NOTE ADULT - ATTENDING COMMENTS
Patient was examined at the bedside and discussed with Dr. Colunga.     She gives a history of not feeling well, with a cough for six months.     Alert, cooperative 86 yo woman  Vital Signs Last 24 Hrs  T(C): 36.9 (07 Sep 2018 15:01), Max: 37.2 (06 Sep 2018 20:56)  T(F): 98.4 (07 Sep 2018 15:01), Max: 99 (06 Sep 2018 20:56)  HR: 73 (07 Sep 2018 15:01) (56 - 80)  BP: 123/52 (07 Sep 2018 15:01) (111/53 - 128/68)  BP(mean): --  RR: 17 (07 Sep 2018 15:01) (16 - 20)  SpO2: 96% (07 Sep 2018 15:01) (96% - 97%)  Kyphosis  Lungs, rales, most prominent over the RUL.  No wheezes  Cor, RRR  Abdomen, soft  Neurological, intact    < from: Xray Chest 1 View AP/PA (09.06.18 @ 07:54) >      There is infiltrate in the right upper lobe. Smaller infiltrate the right   base is also seen.    IMPRESSION: Infiltration as above.    < end of copied text >    IMP:  Chronic cough, rales, multilobar infiltrates.  Doubt acute pyogenic pneumonia.   Need to exclude TB, histoplasmosis.   Plan:  AFB isolation.  Sputum AFB x 3.  Avoid empirical therapy with quinolone, as not to mask possible TB.            Urine histoplasma antigen.

## 2018-09-07 NOTE — PROGRESS NOTE ADULT - PROBLEM SELECTOR PLAN 1
presented with chronic cough and acute SOB/COLVIN starting this AM   chest xray : right upper lobe and base pneumonia   P/E: diffuse rhonchi over right side   follow blood culture, legionella antigen and RVP  stat dose of rocphin and zithromax in ED  will continue with rocphin and zithromax presented with chronic cough and acute SOB/COLVIN starting this AM   chest xray: right upper lobe and base pneumonia   P/E: diffuse rhonchi over right side   follow blood culture  legionella and RVP negative  stat dose of rocphin and zithromax in ED  will continue with rocephin and zithromax presented with chronic cough and acute SOB/COLVIN starting this AM  vs CHF? f/u echo   chest xray: right upper lobe and base pneumonia   P/E: diffuse rhonchi over right side   follow blood culture  legionella and RVP negative  stat dose of rocphin and zithromax in ED  will continue with rocephin and zithromax

## 2018-09-08 DIAGNOSIS — E55.9 VITAMIN D DEFICIENCY, UNSPECIFIED: ICD-10-CM

## 2018-09-08 DIAGNOSIS — I27.20 PULMONARY HYPERTENSION, UNSPECIFIED: ICD-10-CM

## 2018-09-08 LAB
ANION GAP SERPL CALC-SCNC: 6 MMOL/L — SIGNIFICANT CHANGE UP (ref 5–17)
BUN SERPL-MCNC: 13 MG/DL — SIGNIFICANT CHANGE UP (ref 7–18)
CALCIUM SERPL-MCNC: 8.1 MG/DL — LOW (ref 8.4–10.5)
CHLORIDE SERPL-SCNC: 109 MMOL/L — HIGH (ref 96–108)
CO2 SERPL-SCNC: 25 MMOL/L — SIGNIFICANT CHANGE UP (ref 22–31)
CREAT SERPL-MCNC: 0.87 MG/DL — SIGNIFICANT CHANGE UP (ref 0.5–1.3)
GLUCOSE SERPL-MCNC: 85 MG/DL — SIGNIFICANT CHANGE UP (ref 70–99)
HCT VFR BLD CALC: 33 % — LOW (ref 34.5–45)
HGB BLD-MCNC: 10.5 G/DL — LOW (ref 11.5–15.5)
MCHC RBC-ENTMCNC: 29.4 PG — SIGNIFICANT CHANGE UP (ref 27–34)
MCHC RBC-ENTMCNC: 31.9 GM/DL — LOW (ref 32–36)
MCV RBC AUTO: 92.3 FL — SIGNIFICANT CHANGE UP (ref 80–100)
NIGHT BLUE STAIN TISS: SIGNIFICANT CHANGE UP
PLATELET # BLD AUTO: 180 K/UL — SIGNIFICANT CHANGE UP (ref 150–400)
POTASSIUM SERPL-MCNC: 3.5 MMOL/L — SIGNIFICANT CHANGE UP (ref 3.5–5.3)
POTASSIUM SERPL-SCNC: 3.5 MMOL/L — SIGNIFICANT CHANGE UP (ref 3.5–5.3)
RBC # BLD: 3.57 M/UL — LOW (ref 3.8–5.2)
RBC # FLD: 13 % — SIGNIFICANT CHANGE UP (ref 10.3–14.5)
SODIUM SERPL-SCNC: 140 MMOL/L — SIGNIFICANT CHANGE UP (ref 135–145)
SPECIMEN SOURCE: SIGNIFICANT CHANGE UP
WBC # BLD: 9.6 K/UL — SIGNIFICANT CHANGE UP (ref 3.8–10.5)
WBC # FLD AUTO: 9.6 K/UL — SIGNIFICANT CHANGE UP (ref 3.8–10.5)

## 2018-09-08 PROCEDURE — 71250 CT THORAX DX C-: CPT | Mod: 26

## 2018-09-08 PROCEDURE — 99233 SBSQ HOSP IP/OBS HIGH 50: CPT | Mod: GC

## 2018-09-08 RX ORDER — ERGOCALCIFEROL 1.25 MG/1
50000 CAPSULE ORAL
Qty: 0 | Refills: 0 | Status: DISCONTINUED | OUTPATIENT
Start: 2018-09-08 | End: 2018-09-11

## 2018-09-08 RX ADMIN — AZITHROMYCIN 250 MILLIGRAM(S): 500 TABLET, FILM COATED ORAL at 05:29

## 2018-09-08 RX ADMIN — PANTOPRAZOLE SODIUM 40 MILLIGRAM(S): 20 TABLET, DELAYED RELEASE ORAL at 06:20

## 2018-09-08 RX ADMIN — ENOXAPARIN SODIUM 40 MILLIGRAM(S): 100 INJECTION SUBCUTANEOUS at 11:05

## 2018-09-08 RX ADMIN — ROPINIROLE 0.5 MILLIGRAM(S): 8 TABLET, FILM COATED, EXTENDED RELEASE ORAL at 11:05

## 2018-09-08 RX ADMIN — CEFTRIAXONE 100 GRAM(S): 500 INJECTION, POWDER, FOR SOLUTION INTRAMUSCULAR; INTRAVENOUS at 05:29

## 2018-09-08 RX ADMIN — ATORVASTATIN CALCIUM 10 MILLIGRAM(S): 80 TABLET, FILM COATED ORAL at 21:11

## 2018-09-08 NOTE — PROGRESS NOTE ADULT - PROBLEM SELECTOR PLAN 1
-suspected MULTIFOCAL PNA  presented with acute on chronic cough and acute SOB/COLVIN. Patient symptomatically improved with resolution of leukocytosis   chest xray: right upper lobe and base infiltrate. In light of distribution of pulmonary abnormalities isolated for possible pulmonary MTB. An elevated procalcitonin of 0.9 may support a bacterial infection  -AFB sputum collection in progress with one sample collected thus far  blood culture unremarkable thus far  legionella and RVP negative  -continue with rocephin and zithromax  -airborne isolation  -f/u quantiferon gold

## 2018-09-08 NOTE — PROGRESS NOTE ADULT - PROBLEM SELECTOR PLAN 2
TTE w/ severely elevated RV pressures with noted grade III diastolic dysfunction  -will review echo results with Dr. Calvin, if confirmed elevated RV/PA pressures will discuss w/ pulmonary

## 2018-09-08 NOTE — PROGRESS NOTE ADULT - ASSESSMENT
87F walks independent lives in daughter Amirah([phone]) in family house with PMH of restless leg syndrome (on Ropinirole), HTN (was on lisinopril which was discontinued likely 2/2 chronic dry cough), HLD presented with SOB and COLVIN of one day.

## 2018-09-08 NOTE — PROGRESS NOTE ADULT - PROBLEM SELECTOR PLAN 4
was on lisinopril which was taken off 2/2 cough   will continue monitoring blood pressure- can add norvasc  if needed

## 2018-09-08 NOTE — PROGRESS NOTE ADULT - SUBJECTIVE AND OBJECTIVE BOX
Patient is a 87y old  Female who presents with a chief complaint of cough for last 6 months (07 Sep 2018 13:54)      INTERVAL HPI/OVERNIGHT EVENTS: no new complaints. She reports her cough is better today. No fevers/chills/ nausea/vomiting/sweats  CT chest is pending--> I have called the radiology dept and patient will be taken for study this evening. Family at bedside with update provided with patient permission.  AFB collected this am.     MEDICATIONS  (STANDING):  atorvastatin 10 milliGRAM(s) Oral at bedtime  azithromycin  IVPB 500 milliGRAM(s) IV Intermittent every 24 hours  cefTRIAXone   IVPB 1 Gram(s) IV Intermittent every 24 hours  enoxaparin Injectable 40 milliGRAM(s) SubCutaneous daily  influenza   Vaccine 0.5 milliLiter(s) IntraMuscular once  pantoprazole    Tablet 40 milliGRAM(s) Oral before breakfast  rOPINIRole 0.5 milliGRAM(s) Oral daily    MEDICATIONS  (PRN):      __________________________________________________  REVIEW OF SYSTEMS:    CONSTITUTIONAL: No fever,   EYES: no acute visual disturbances  NECK: No pain or stiffness  RESPIRATORY: + cough (improved); No shortness of breath  CARDIOVASCULAR: No chest pain, no palpitations  GASTROINTESTINAL: No pain. No nausea or vomiting; No diarrhea   NEUROLOGICAL: No headache or numbness, no tremors  MUSCULOSKELETAL: No joint pain, no muscle pain  GENITOURINARY: no dysuria, no frequency, no hesitancy  PSYCHIATRY: no depression , no anxiety  ALL OTHER  ROS negative        Vital Signs Last 24 Hrs  T(C): 37.1 (08 Sep 2018 21:02), Max: 37.1 (08 Sep 2018 21:02)  T(F): 98.8 (08 Sep 2018 21:02), Max: 98.8 (08 Sep 2018 21:02)  HR: 117 (08 Sep 2018 21:02) (58 - 117)  BP: 165/95 (08 Sep 2018 21:02) (114/63 - 165/95)  BP(mean): --  RR: 18 (08 Sep 2018 21:02) (16 - 18)  SpO2: 95% (08 Sep 2018 21:02) (95% - 97%)    ________________________________________________  PHYSICAL EXAM:  GENERAL: NAD, non-toxic appearing  HEENT: Normocephalic;  conjunctivae and sclerae clear; moist mucous membranes;   NECK : supple  CHEST/LUNG: Clear to auscultation bilaterally with good air entry   HEART: S1 S2  regular; no murmurs, gallops or rubs  ABDOMEN: Soft, Nontender, Nondistended; Bowel sounds present  EXTREMITIES: no cyanosis; no edema; no calf tenderness  SKIN: warm and dry; no rash  NERVOUS SYSTEM:  Awake and alert; Oriented  to place, person and time ; no new deficits    _________________________________________________  LABS:                        10.5   9.6   )-----------( 180      ( 08 Sep 2018 05:43 )             33.0     09-08    140  |  109<H>  |  13  ----------------------------<  85  3.5   |  25  |  0.87    Ca    8.1<L>      08 Sep 2018 05:43  Phos  2.5     09-07  Mg     2.1     09-07          CAPILLARY BLOOD GLUCOSE            RADIOLOGY & ADDITIONAL TESTS:    TTE: 1. Mitral annular calcification. Mild to moderate mitral  regurgitation.  2. Calcified trileaflet aortic valve with decreased  opening. Peak transaortic valve gradient equals 33.6 mm Hg,  mean transaortic valve gradient equals 19 mm Hg, estimated  aortic valve area equals 1.2 sqcm (by continuity equation),  consistent with moderate aortic stenosis. Mild aortic  insufficiency.  3. Aortic Root: 3.0 cm.  4. Moderate left atrial enlargement.  5. Normal left ventricular internal dimensions and wall  thicknesses.  6. Normal Left Ventricular Systolic Function,  (EF = 55 to  60%) Peak left ventricular outflow tract gradient equals  7.8 mm Hg.  7. Grade III diastolic dysfunction.  8. Normal right atrium.  9. Normal right ventricular size and function.  10. RA Pressure is 8 mm Hg.  11. RV systolic pressure is severely increased at  60 mm  Hg.  12. There is mild tricuspid regurgitation.  13. There is mild pulmonic regurgitation.  14. Normal pericardium with no pericardial effusion.    Imaging Personally Reviewed:  YES/NO    Consultant(s) Notes Reviewed:   YES/ No    Care Discussed with Consultants :     Plan of care was discussed with patient and /or primary care giver; all questions and concerns were addressed and care was aligned with patient's wishes.

## 2018-09-09 LAB
ANION GAP SERPL CALC-SCNC: 8 MMOL/L — SIGNIFICANT CHANGE UP (ref 5–17)
BUN SERPL-MCNC: 14 MG/DL — SIGNIFICANT CHANGE UP (ref 7–18)
CALCIUM SERPL-MCNC: 8.6 MG/DL — SIGNIFICANT CHANGE UP (ref 8.4–10.5)
CHLORIDE SERPL-SCNC: 108 MMOL/L — SIGNIFICANT CHANGE UP (ref 96–108)
CO2 SERPL-SCNC: 25 MMOL/L — SIGNIFICANT CHANGE UP (ref 22–31)
CREAT SERPL-MCNC: 0.89 MG/DL — SIGNIFICANT CHANGE UP (ref 0.5–1.3)
GLUCOSE SERPL-MCNC: 91 MG/DL — SIGNIFICANT CHANGE UP (ref 70–99)
HCT VFR BLD CALC: 37.7 % — SIGNIFICANT CHANGE UP (ref 34.5–45)
HGB BLD-MCNC: 11.8 G/DL — SIGNIFICANT CHANGE UP (ref 11.5–15.5)
MCHC RBC-ENTMCNC: 29.2 PG — SIGNIFICANT CHANGE UP (ref 27–34)
MCHC RBC-ENTMCNC: 31.2 GM/DL — LOW (ref 32–36)
MCV RBC AUTO: 93.6 FL — SIGNIFICANT CHANGE UP (ref 80–100)
PLATELET # BLD AUTO: 208 K/UL — SIGNIFICANT CHANGE UP (ref 150–400)
POTASSIUM SERPL-MCNC: 3.5 MMOL/L — SIGNIFICANT CHANGE UP (ref 3.5–5.3)
POTASSIUM SERPL-SCNC: 3.5 MMOL/L — SIGNIFICANT CHANGE UP (ref 3.5–5.3)
RBC # BLD: 4.02 M/UL — SIGNIFICANT CHANGE UP (ref 3.8–5.2)
RBC # FLD: 12.9 % — SIGNIFICANT CHANGE UP (ref 10.3–14.5)
SODIUM SERPL-SCNC: 141 MMOL/L — SIGNIFICANT CHANGE UP (ref 135–145)
WBC # BLD: 8.5 K/UL — SIGNIFICANT CHANGE UP (ref 3.8–10.5)
WBC # FLD AUTO: 8.5 K/UL — SIGNIFICANT CHANGE UP (ref 3.8–10.5)

## 2018-09-09 PROCEDURE — 99233 SBSQ HOSP IP/OBS HIGH 50: CPT

## 2018-09-09 PROCEDURE — 99233 SBSQ HOSP IP/OBS HIGH 50: CPT | Mod: GC

## 2018-09-09 RX ADMIN — CEFTRIAXONE 100 GRAM(S): 500 INJECTION, POWDER, FOR SOLUTION INTRAMUSCULAR; INTRAVENOUS at 05:52

## 2018-09-09 RX ADMIN — ROPINIROLE 0.5 MILLIGRAM(S): 8 TABLET, FILM COATED, EXTENDED RELEASE ORAL at 11:25

## 2018-09-09 RX ADMIN — ATORVASTATIN CALCIUM 10 MILLIGRAM(S): 80 TABLET, FILM COATED ORAL at 22:47

## 2018-09-09 RX ADMIN — ENOXAPARIN SODIUM 40 MILLIGRAM(S): 100 INJECTION SUBCUTANEOUS at 11:25

## 2018-09-09 RX ADMIN — PANTOPRAZOLE SODIUM 40 MILLIGRAM(S): 20 TABLET, DELAYED RELEASE ORAL at 06:02

## 2018-09-09 RX ADMIN — ERGOCALCIFEROL 50000 UNIT(S): 1.25 CAPSULE ORAL at 05:52

## 2018-09-09 RX ADMIN — AZITHROMYCIN 250 MILLIGRAM(S): 500 TABLET, FILM COATED ORAL at 05:52

## 2018-09-09 NOTE — PROGRESS NOTE ADULT - PROBLEM SELECTOR PLAN 2
was on lisinopril which was taken off 2/2 cough   will continue monitoring - add medication if needed

## 2018-09-09 NOTE — PROGRESS NOTE ADULT - ATTENDING COMMENTS
Patient seen/evaluated at bedside. I agree with the resident progress note/outlined plan of care. My independent findings and conclusions are documented    Pt feels well. Reports improvement in cough. No fevers/chills, nausea    CT chest: Findings suggest multifocal pneumonia, worse in the right upper and lower   lobes. Follow-up imaging to resolution is recommended.    Mediastinal and suspected right hilar adenopathy is probably reactive.      1.multifocal PNA: RuL/RLL infiltrate  clinically improved w/ resolved leukocytosis  CT chest suggest multifocal pna. elevated Procalcitonin supports infectious bacterial process  -ceftriaxone/azithromycin  -quantiferon gold negative  -supportive management w/ anti-tussive  -RVP/, urine legionella negative  -afb neg x1, another sample was collected last evening. Pending collection of 3rd sample  -f/u histoplasmosis  2. ?pulm HTN  3. vitamin D deficiency    4. HTN  5. Restless leg syndrome  6. HLD    will have cardiology, Dr Aranda review TTE if able  c/w ropinerole/statin   discharge w/ 3rd negative AFB

## 2018-09-09 NOTE — PROGRESS NOTE ADULT - PROBLEM SELECTOR PLAN 1
presented with chronic cough and acute SOB/COLVIN starting this AM  vs CHF? f/u echo   chest xray: right upper lobe and base pneumonia   P/E: diffuse rhonchi over right side   follow blood culture  legionella and RVP negative  stat dose of rocphin and zithromax in ED  will continue with rocephin and zithromax presented with chronic cough and acute SOB/COLVIN  vs CHF?   Echo - grade 3 diastolic dysfunction, EF 50-55%, moderate AS  chest xray: right upper lobe and base pneumonia   CT chest - multifocal pna worse in RUL and RLL  P/E: diffuse rhonchi over right side   blood cx - negative  legionella and RVP negative  stat dose of rocphin and zithromax in ED  will continue with rocephin and zithromax  Pulm - Dr. Tellez

## 2018-09-09 NOTE — PROGRESS NOTE ADULT - ASSESSMENT
87F walks independent lives in daughter Amirah([phone]) in family house with PMH of restless leg syndrome (on Ropinirole), HTN (was on lisinopril which was discontinued likely 2/2 chronic dry cough), HLD presented with SOB and COLVIN of one day.

## 2018-09-09 NOTE — PROGRESS NOTE ADULT - SUBJECTIVE AND OBJECTIVE BOX
Interval Events: No overnight events. Cough has improved and is nonproductive. She denies any current SOB or chest pain. Appetite is good.    OBJECTIVE:    T(C): 36.8 (09 Sep 2018 13:52), Max: 37.1 (08 Sep 2018 21:02)  T(F): 98.2 (09 Sep 2018 13:52), Max: 98.8 (08 Sep 2018 21:02)  HR: 73 (09 Sep 2018 13:52) (61 - 117)  BP: 149/84 (09 Sep 2018 13:52) (139/59 - 165/95)  RR: 18 (09 Sep 2018 13:52) (18 - 18)  SpO2: 96% (09 Sep 2018 13:52) (95% - 96%)    CAPILLARY BLOOD GLUCOSE    PHYSICAL EXAM:  General: Awake, alert, oriented X 3.   HEENT: Atraumatic, PERRL, Anicteric.   Lymph Nodes: No palpable lymphadenopathy  Respiratory: Occ rhonchi lung  Cardiovascular: S1 S2 normal. No murmurs, rubs or gallops.   Abdomen: Soft, non-tender, non-distended. No organomegaly.  Extremities: Warm to touch. Peripheral pulse palpable. No pedal edema.   Skin: No rashes or skin lesions  Neurological: No focal deficits.  Psychiatry: Appropriate mood and affect.    HOSPITAL MEDICATIONS:  MEDICATIONS  (STANDING):  atorvastatin 10 milliGRAM(s) Oral at bedtime  azithromycin  IVPB 500 milliGRAM(s) IV Intermittent every 24 hours  cefTRIAXone   IVPB 1 Gram(s) IV Intermittent every 24 hours  enoxaparin Injectable 40 milliGRAM(s) SubCutaneous daily  ergocalciferol 06010 Unit(s) Oral <User Schedule>  influenza   Vaccine 0.5 milliLiter(s) IntraMuscular once  pantoprazole    Tablet 40 milliGRAM(s) Oral before breakfast  rOPINIRole 0.5 milliGRAM(s) Oral daily    MEDICATIONS  (PRN):    LABS:                        11.8   8.5   )-----------( 208      ( 09 Sep 2018 06:42 )             37.7     09-09    141  |  108  |  14  ----------------------------<  91  3.5   |  25  |  0.89    Ca    8.6      09 Sep 2018 06:42    MICROBIOLOGY:     Sputum Afib negative x 1  Quantiferon pending  Legionella negative  Blood cultures- no growth    RADIOLOGY:  [X] Reviewed and interpreted by me  CT chest without contrast- small bilateral pleural effusions.  Extensive ground glass infiltrates in the RUL- predominently apical segments, and RLL with associated right sided hilar adenopathy

## 2018-09-09 NOTE — PROGRESS NOTE ADULT - SUBJECTIVE AND OBJECTIVE BOX
PGY1 Note discussed with Supervising Resident and Primary Attending.    Patient is a 87y old  Female who presents with a chief complaint of cough for last 6 months (08 Sep 2018 17:15)      INTERVAL HPI/OVERNIGHT EVENTS :  No acute overnight events. Patient seen and examined at bedside. Patient has no current complaints. Patient denies nausea, vomiting, diarrhea, chest pain, SOB, headache.  MEDICATIONS  (STANDING):  atorvastatin 10 milliGRAM(s) Oral at bedtime  azithromycin  IVPB 500 milliGRAM(s) IV Intermittent every 24 hours  cefTRIAXone   IVPB 1 Gram(s) IV Intermittent every 24 hours  enoxaparin Injectable 40 milliGRAM(s) SubCutaneous daily  ergocalciferol 66142 Unit(s) Oral <User Schedule>  influenza   Vaccine 0.5 milliLiter(s) IntraMuscular once  pantoprazole    Tablet 40 milliGRAM(s) Oral before breakfast  rOPINIRole 0.5 milliGRAM(s) Oral daily    MEDICATIONS  (PRN):      Allergies    No Known Allergies    Intolerances        REVIEW OF SYSTEMS :  * General: denies chills, fatigue  * Eyes: denies vision changes  * Respiratory: denies cough, SOB, wheezing  * Cardio: denies chest pain, palpitations  * GI: denies abd pain, nausea, vomiting, diarrhea  * : denies dysuria  * Neuro: denies headaches, numbness, weakness  * MSK: denies joint pain  * Skin: denies itching, rashes    Vital Signs Last 24 Hrs  T(C): 36.6 (09 Sep 2018 05:02), Max: 37.1 (08 Sep 2018 21:02)  T(F): 97.9 (09 Sep 2018 05:02), Max: 98.8 (08 Sep 2018 21:02)  HR: 61 (09 Sep 2018 05:02) (58 - 117)  BP: 139/59 (09 Sep 2018 05:02) (139/59 - 165/95)  BP(mean): --  RR: 18 (09 Sep 2018 05:02) (17 - 18)  SpO2: 95% (09 Sep 2018 05:02) (95% - 96%)    PHYSICAL EXAM :  * General: no acute distress, well-nourished, well-developed  * HEENT: atraumatic, normocephalic; moist mucus membranes; supple neck; no JVD  * CN: EOMI, PERRL  * Respiratory: cta b/l; no wheezes, rales, rhonchi  * Cardio: RRR; no appreciable murmurs, rubs, gallops  * Abd: soft, nontender, nondistended, +BS  * Neuro: AAOx3; strength 5/5; sensation intact throughout  * Extremities: no clubbing, cyanosis, edema  * Skin: no rashes    LABS:                          11.8   8.5   )-----------( 208      ( 09 Sep 2018 06:42 )             37.7     09-09    141  |  108  |  14  ----------------------------<  91  3.5   |  25  |  0.89    Ca    8.6      09 Sep 2018 06:42          CAPILLARY BLOOD GLUCOSE          RADIOLOGY & ADDITIONAL TESTS:   no new imaging    Imaging Personally Reviewed:    Consultant(s) Notes Reviewed: PGY1 Note discussed with Supervising Resident and Primary Attending.    Patient is a 87y old  Female who presents with a chief complaint of cough for last 6 months (08 Sep 2018 17:15)      INTERVAL HPI/OVERNIGHT EVENTS :  No acute overnight events. Patient seen and examined at bedside. Patient reports mild cough with sputum and SOB, improving, says she is feeling better. Patient denies nausea, vomiting, diarrhea, chest pain, headache.    MEDICATIONS  (STANDING):  atorvastatin 10 milliGRAM(s) Oral at bedtime  azithromycin  IVPB 500 milliGRAM(s) IV Intermittent every 24 hours  cefTRIAXone   IVPB 1 Gram(s) IV Intermittent every 24 hours  enoxaparin Injectable 40 milliGRAM(s) SubCutaneous daily  ergocalciferol 13828 Unit(s) Oral <User Schedule>  influenza   Vaccine 0.5 milliLiter(s) IntraMuscular once  pantoprazole    Tablet 40 milliGRAM(s) Oral before breakfast  rOPINIRole 0.5 milliGRAM(s) Oral daily    MEDICATIONS  (PRN):      Allergies    No Known Allergies    Intolerances        REVIEW OF SYSTEMS :  * General: denies chills, fatigue  * Eyes: denies vision changes  * Respiratory: reports cough, SOB  * Cardio: denies chest pain, palpitations  * GI: denies abd pain, nausea, vomiting, diarrhea  * : denies dysuria  * Neuro: denies headaches, numbness, weakness  * MSK: denies joint pain  * Skin: denies itching, rashes    Vital Signs Last 24 Hrs  T(C): 36.6 (09 Sep 2018 05:02), Max: 37.1 (08 Sep 2018 21:02)  T(F): 97.9 (09 Sep 2018 05:02), Max: 98.8 (08 Sep 2018 21:02)  HR: 61 (09 Sep 2018 05:02) (58 - 117)  BP: 139/59 (09 Sep 2018 05:02) (139/59 - 165/95)  BP(mean): --  RR: 18 (09 Sep 2018 05:02) (17 - 18)  SpO2: 95% (09 Sep 2018 05:02) (95% - 96%)    PHYSICAL EXAM :  * General: no acute distress, well-nourished, well-developed  * HEENT: atraumatic, normocephalic; moist mucus membranes; supple neck; no JVD  * CN: EOMI, PERRL  * Respiratory: diffuse rhonchi, more so on right side; no wheezing or rales  * Cardio: RRR; no appreciable murmurs, rubs, gallops  * Abd: soft, nontender, nondistended, +BS  * Neuro: AAOx3; strength 5/5; sensation intact throughout  * Extremities: no clubbing, cyanosis, edema  * Skin: no rashes    LABS:                          11.8   8.5   )-----------( 208      ( 09 Sep 2018 06:42 )             37.7     09-09    141  |  108  |  14  ----------------------------<  91  3.5   |  25  |  0.89    Ca    8.6      09 Sep 2018 06:42          CAPILLARY BLOOD GLUCOSE          RADIOLOGY & ADDITIONAL TESTS:   CT chest - multifocal pna, worse in RUL and RLL    Imaging Personally Reviewed: yes    Consultant(s) Notes Reviewed: yes

## 2018-09-09 NOTE — PROGRESS NOTE ADULT - ASSESSMENT
Impression:    1) Multilobar pneumonia- RUL, RLL pneumonia- DDx- Community acquired pneumonia, MTB, Atypical pneumonia  2) Chronic cough with night sweats and chills      Recommendations  Clinically stable. No abnormalities seen on CT chest that would be more specific for tuberculosis  AFB negative x 1  Likely will need sputum induction by respiratory therapy with 3% saline for the next 2 samples  Once AFB negative x3,  can complete course of abx for CAP which can be completed as an outpatient if ambulating without difficulty.  Official cultures for TB can be followed up in 4 weeks post discharge along with a repeat CXR after completion of abx in 4 weeks.

## 2018-09-10 ENCOUNTER — TRANSCRIPTION ENCOUNTER (OUTPATIENT)
Age: 83
End: 2018-09-10

## 2018-09-10 LAB
ANION GAP SERPL CALC-SCNC: 8 MMOL/L — SIGNIFICANT CHANGE UP (ref 5–17)
BUN SERPL-MCNC: 15 MG/DL — SIGNIFICANT CHANGE UP (ref 7–18)
CALCIUM SERPL-MCNC: 8.9 MG/DL — SIGNIFICANT CHANGE UP (ref 8.4–10.5)
CHLORIDE SERPL-SCNC: 109 MMOL/L — HIGH (ref 96–108)
CO2 SERPL-SCNC: 28 MMOL/L — SIGNIFICANT CHANGE UP (ref 22–31)
CREAT SERPL-MCNC: 0.9 MG/DL — SIGNIFICANT CHANGE UP (ref 0.5–1.3)
GLUCOSE SERPL-MCNC: 90 MG/DL — SIGNIFICANT CHANGE UP (ref 70–99)
HCT VFR BLD CALC: 37.6 % — SIGNIFICANT CHANGE UP (ref 34.5–45)
HGB BLD-MCNC: 12 G/DL — SIGNIFICANT CHANGE UP (ref 11.5–15.5)
MCHC RBC-ENTMCNC: 29.3 PG — SIGNIFICANT CHANGE UP (ref 27–34)
MCHC RBC-ENTMCNC: 31.9 GM/DL — LOW (ref 32–36)
MCV RBC AUTO: 92 FL — SIGNIFICANT CHANGE UP (ref 80–100)
NIGHT BLUE STAIN TISS: SIGNIFICANT CHANGE UP
NIGHT BLUE STAIN TISS: SIGNIFICANT CHANGE UP
PLATELET # BLD AUTO: 244 K/UL — SIGNIFICANT CHANGE UP (ref 150–400)
POTASSIUM SERPL-MCNC: 4.3 MMOL/L — SIGNIFICANT CHANGE UP (ref 3.5–5.3)
POTASSIUM SERPL-SCNC: 4.3 MMOL/L — SIGNIFICANT CHANGE UP (ref 3.5–5.3)
RBC # BLD: 4.08 M/UL — SIGNIFICANT CHANGE UP (ref 3.8–5.2)
RBC # FLD: 12.5 % — SIGNIFICANT CHANGE UP (ref 10.3–14.5)
SODIUM SERPL-SCNC: 145 MMOL/L — SIGNIFICANT CHANGE UP (ref 135–145)
SPECIMEN SOURCE: SIGNIFICANT CHANGE UP
SPECIMEN SOURCE: SIGNIFICANT CHANGE UP
WBC # BLD: 7.7 K/UL — SIGNIFICANT CHANGE UP (ref 3.8–10.5)
WBC # FLD AUTO: 7.7 K/UL — SIGNIFICANT CHANGE UP (ref 3.8–10.5)

## 2018-09-10 PROCEDURE — 99233 SBSQ HOSP IP/OBS HIGH 50: CPT | Mod: GC

## 2018-09-10 RX ORDER — HYDROXYZINE HCL 10 MG
1 TABLET ORAL
Qty: 0 | Refills: 0 | COMMUNITY

## 2018-09-10 RX ORDER — ERGOCALCIFEROL 1.25 MG/1
1 CAPSULE ORAL
Qty: 7 | Refills: 0 | OUTPATIENT
Start: 2018-09-10 | End: 2018-10-29

## 2018-09-10 RX ADMIN — ROPINIROLE 0.5 MILLIGRAM(S): 8 TABLET, FILM COATED, EXTENDED RELEASE ORAL at 11:20

## 2018-09-10 RX ADMIN — AZITHROMYCIN 250 MILLIGRAM(S): 500 TABLET, FILM COATED ORAL at 05:11

## 2018-09-10 RX ADMIN — CEFTRIAXONE 100 GRAM(S): 500 INJECTION, POWDER, FOR SOLUTION INTRAMUSCULAR; INTRAVENOUS at 05:11

## 2018-09-10 RX ADMIN — ATORVASTATIN CALCIUM 10 MILLIGRAM(S): 80 TABLET, FILM COATED ORAL at 21:28

## 2018-09-10 RX ADMIN — ENOXAPARIN SODIUM 40 MILLIGRAM(S): 100 INJECTION SUBCUTANEOUS at 11:20

## 2018-09-10 NOTE — DISCHARGE NOTE ADULT - MEDICATION SUMMARY - MEDICATIONS TO STOP TAKING
I will STOP taking the medications listed below when I get home from the hospital:    lisinopril 20 mg oral tablet  -- 1 tab(s) by mouth once a day    Macrobid macrocrystals-monohydrate 100 mg oral capsule  -- 1 cap(s) by mouth 2 times a day  -- Finish all this medication unless otherwise directed by prescriber.  May discolor urine or feces.  Take with food or milk.    nystatin 100,000,000 units compounding powder  -- topical powder compounding 3 times a day to rectum for fungal infection    mupirocin 2% topical cream  -- Apply on skin to affected area 2 times a day  -- For external use only.    Estrace Vaginal 0.1 mg/g vaginal cream  -- 1 gram(s) vaginally 3 times a week   -- Do not take this drug if you are pregnant.  For vaginal use.    conjugated estrogens 0.625 mg/g vaginal cream with applicator  -- 1 gram(s) vaginally once a day (in the evening)   -- Do not take this drug if you are pregnant.  For vaginal use.    hydrOXYzine hydrochloride 10 mg oral tablet  -- 1 tab(s) by mouth once a day

## 2018-09-10 NOTE — DISCHARGE NOTE ADULT - CARE PROVIDER_API CALL
Orlando Polanco; MBBS), Internal Medicine  [id number] [address] [place] [address]  Phone: [phone]  Fax: [phone]

## 2018-09-10 NOTE — DISCHARGE NOTE ADULT - PLAN OF CARE
Resolution You presented to the hospital with a cough for 6 months and recent onset of shortness of breath. You had a chest xray that showed multifocal pneumonia. You were started on antibiotics and your symptoms improved. Due to chronic cough, you were test for tuberculosis. The tests came back negative. BP control You have a history of hypertension. Your blood pressure has been controlled while in the hospital. Continue taking your medication as prescribed. You presented to the hospital with a cough for 6 months and recent onset of shortness of breath. You had a chest xray that showed multifocal pneumonia. You were started on antibiotics and your symptoms improved. Due to chronic cough, you were test for tuberculosis. The tests came back negative. Followup with your PMD in 1 week. You have a history of hypertension. Your blood pressure has been controlled while in the hospital. You have not been taking lisinopril as it was stopped for causing cough. Your BP has been well maintained without medications. Followup with your PMD in 1 week to monitor BP. You presented to the hospital with a cough for 6 months and recent onset of shortness of breath. You had a chest xray that showed multifocal pneumonia. You were started on antibiotics and your symptoms improved. Due to chronic cough, you were test for tuberculosis. The tests came back negative. Continue with antibiotics for one day: Ceftin 500mg twice a day and Azithromycin 500mg once, the day after discharge. Followup with your PMD in 1 week. You presented to the hospital with a cough for 6 months and recent onset of shortness of breath. You had a chest X-ray that showed multifocal pneumonia. You were started on antibiotics and your symptoms improved. Due to chronic cough, you were test for tuberculosis. The tests came back negative. Continue with antibiotics for one day: Ceftin 500mg twice a day and Azithromycin 500mg once, for one more day the day after discharge. Followup with your PMD in 1 week. You will need a repeat CXR in 6-8 weeks as outpatient BP control, BP< 150/90 You have a history of hypertension. Your blood pressure has been controlled while in the hospital. You have not been taking lisinopril as it was stopped for causing cough. Your BP has been well maintained without medications. Followup with your PMD in 1 week to recheck BP.

## 2018-09-10 NOTE — DISCHARGE NOTE ADULT - PATIENT PORTAL LINK FT
You can access the [facility] Patient Portal, offered by [facility], by registering with the following website: [email or web address]

## 2018-09-10 NOTE — PROGRESS NOTE ADULT - SUBJECTIVE AND OBJECTIVE BOX
PGY1 Note discussed with supervising resident and primary attending.    Patient is a 87y old  Female who presents with a chief complaint of cough for last 6 months (10 Sep 2018 08:04)      INTERVAL HPI/OVERNIGHT EVENTS: no overnight events    MEDICATIONS  (STANDING):  atorvastatin 10 milliGRAM(s) Oral at bedtime  azithromycin  IVPB 500 milliGRAM(s) IV Intermittent every 24 hours  cefTRIAXone   IVPB 1 Gram(s) IV Intermittent every 24 hours  enoxaparin Injectable 40 milliGRAM(s) SubCutaneous daily  ergocalciferol 71444 Unit(s) Oral <User Schedule>  influenza   Vaccine 0.5 milliLiter(s) IntraMuscular once  pantoprazole    Tablet 40 milliGRAM(s) Oral before breakfast  rOPINIRole 0.5 milliGRAM(s) Oral daily    MEDICATIONS  (PRN):      Allergies    No Known Allergies    Intolerances        REVIEW OF SYSTEMS:  CONSTITUTIONAL: No fever, weight loss, or fatigue  RESPIRATORY: No cough, wheezing, chills or hemoptysis; No shortness of breath  CARDIOVASCULAR: No chest pain, palpitations, dizziness, or leg swelling  GASTROINTESTINAL: No abdominal or epigastric pain. No nausea, vomiting, or hematemesis; No diarrhea or constipation. No melena or hematochezia.  NEUROLOGICAL: No headaches, memory loss, loss of strength, numbness, or tremors  SKIN: No itching, burning, rashes, or lesions     Vital Signs Last 24 Hrs  T(C): 36.6 (10 Sep 2018 05:39), Max: 36.6 (09 Sep 2018 21:14)  T(F): 97.9 (10 Sep 2018 05:39), Max: 97.9 (09 Sep 2018 21:14)  HR: 62 (10 Sep 2018 05:39) (57 - 62)  BP: 139/61 (10 Sep 2018 05:39) (129/58 - 139/61)  BP(mean): --  RR: 18 (10 Sep 2018 05:39) (18 - 18)  SpO2: 95% (10 Sep 2018 05:39) (95% - 98%)    PHYSICAL EXAM:  GENERAL: NAD, well-groomed, well-developed  HEAD:  Atraumatic, Normocephalic  EYES: EOMI, PERRLA, conjunctiva and sclera clear  NECK: Supple, No JVD, Normal thyroid  CHEST/LUNG: Clear to percussion bilaterally; No rales, rhonchi, wheezing, or rubs  HEART: Regular rate and rhythm; No murmurs, rubs, or gallops  ABDOMEN: Soft, Nontender, Nondistended; Bowel sounds present  NERVOUS SYSTEM:  Alert & Oriented X3, Good concentration; Motor Strength 5/5 B/L   EXTREMITIES:  2+ Peripheral Pulses, No clubbing, cyanosis, or edema      LABS:                        12.0   7.7   )-----------( 244      ( 10 Sep 2018 08:44 )             37.6     09-10    145  |  109<H>  |  15  ----------------------------<  90  4.3   |  28  |  0.90    Ca    8.9      10 Sep 2018 08:44          CAPILLARY BLOOD GLUCOSE          RADIOLOGY & ADDITIONAL TESTS:    Imaging Personally Reviewed:  [ ] YES  [ ] NO    Consultant(s) Notes Reviewed:  [ ] YES  [ ] NO

## 2018-09-10 NOTE — DISCHARGE NOTE ADULT - CARE PLAN
Principal Discharge DX:	Pneumonia  Goal:	Resolution  Assessment and plan of treatment:	You presented to the hospital with a cough for 6 months and recent onset of shortness of breath. You had a chest xray that showed multifocal pneumonia. You were started on antibiotics and your symptoms improved. Due to chronic cough, you were test for tuberculosis. The tests came back negative.  Secondary Diagnosis:	Hypertension  Goal:	BP control  Assessment and plan of treatment:	You have a history of hypertension. Your blood pressure has been controlled while in the hospital. Continue taking your medication as prescribed. Principal Discharge DX:	Pneumonia  Goal:	Resolution  Assessment and plan of treatment:	You presented to the hospital with a cough for 6 months and recent onset of shortness of breath. You had a chest xray that showed multifocal pneumonia. You were started on antibiotics and your symptoms improved. Due to chronic cough, you were test for tuberculosis. The tests came back negative. Followup with your PMD in 1 week.  Secondary Diagnosis:	Hypertension  Goal:	BP control  Assessment and plan of treatment:	You have a history of hypertension. Your blood pressure has been controlled while in the hospital. You have not been taking lisinopril as it was stopped for causing cough. Your BP has been well maintained without medications. Followup with your PMD in 1 week to monitor BP. Principal Discharge DX:	Pneumonia  Goal:	Resolution  Assessment and plan of treatment:	You presented to the hospital with a cough for 6 months and recent onset of shortness of breath. You had a chest xray that showed multifocal pneumonia. You were started on antibiotics and your symptoms improved. Due to chronic cough, you were test for tuberculosis. The tests came back negative. Continue with antibiotics for one day: Ceftin 500mg twice a day and Azithromycin 500mg once, the day after discharge. Followup with your PMD in 1 week.  Secondary Diagnosis:	Hypertension  Goal:	BP control  Assessment and plan of treatment:	You have a history of hypertension. Your blood pressure has been controlled while in the hospital. You have not been taking lisinopril as it was stopped for causing cough. Your BP has been well maintained without medications. Followup with your PMD in 1 week to monitor BP. Principal Discharge DX:	Pneumonia  Goal:	Resolution  Assessment and plan of treatment:	You presented to the hospital with a cough for 6 months and recent onset of shortness of breath. You had a chest X-ray that showed multifocal pneumonia. You were started on antibiotics and your symptoms improved. Due to chronic cough, you were test for tuberculosis. The tests came back negative. Continue with antibiotics for one day: Ceftin 500mg twice a day and Azithromycin 500mg once, for one more day the day after discharge. Followup with your PMD in 1 week. You will need a repeat CXR in 6-8 weeks as outpatient  Secondary Diagnosis:	Hypertension  Goal:	BP control, BP< 150/90  Assessment and plan of treatment:	You have a history of hypertension. Your blood pressure has been controlled while in the hospital. You have not been taking lisinopril as it was stopped for causing cough. Your BP has been well maintained without medications. Followup with your PMD in 1 week to recheck BP.

## 2018-09-10 NOTE — DISCHARGE NOTE ADULT - MEDICATION SUMMARY - MEDICATIONS TO TAKE
I will START or STAY ON the medications listed below when I get home from the hospital:    lovastatin 20 mg oral tablet  -- 1 tab(s) by mouth once a day  -- Indication: For Dyslipidemia    rOPINIRole 0.5 mg oral tablet  -- 1 tab(s) by mouth once a day  -- Indication: For Restless leg syndrome    cefuroxime 500 mg oral tablet  -- 1 tab(s) by mouth 2 times a day   -- Finish all this medication unless otherwise directed by prescriber.  Medication should be taken with plenty of water.  Take with food or milk.    -- Indication: For Pneumonia    Zithromax 500 mg oral tablet  -- 1 tab(s) by mouth once a day  -- Indication: For Pneumonia    omeprazole 20 mg oral delayed release capsule  -- 1 cap(s) by mouth once a day  -- Indication: For Need for prophylactic measure    Drisdol 50,000 intl units (1.25 mg) oral capsule  -- 1 cap(s) by mouth once a week  -- Indication: For Vitamin D deficiency

## 2018-09-10 NOTE — PROGRESS NOTE ADULT - SUBJECTIVE AND OBJECTIVE BOX
Interval Events: No overnight events. Feeling better. Cough improved.    OBJECTIVE:    T(C): 36.6 (10 Sep 2018 05:39), Max: 36.8 (09 Sep 2018 13:52)  T(F): 97.9 (10 Sep 2018 05:39), Max: 98.2 (09 Sep 2018 13:52)  HR: 62 (10 Sep 2018 05:39) (57 - 73)  BP: 139/61 (10 Sep 2018 05:39) (129/58 - 149/84)  RR: 18 (10 Sep 2018 05:39) (18 - 18)  SpO2: 95% (10 Sep 2018 05:39) (95% - 98%) RA    09-09 @ 07:01  -  09-10 @ 07:00  --------------------------------------------------------  IN: 240 mL / OUT: 0 mL / NET: 240 mL    CAPILLARY BLOOD GLUCOSE    PHYSICAL EXAM:  General: Awake, alert, oriented X 3.   HEENT: Atraumatic, PERRL, Anicteric.   Lymph Nodes: No palpable lymphadenopathy  Respiratory: Clear to auscultation bilaterally, no wheezes, rhonchi, or rales  Cardiovascular: S1 S2 normal. No murmurs, rubs or gallops.   Abdomen: Soft, non-tender, non-distended. No organomegaly.  Extremities: Warm to touch. Peripheral pulse palpable. No pedal edema.   Skin: No rashes or skin lesions  Neurological: No focal deficits.  Psychiatry: Appropriate mood and affect.    HOSPITAL MEDICATIONS:  MEDICATIONS  (STANDING):  atorvastatin 10 milliGRAM(s) Oral at bedtime  azithromycin  IVPB 500 milliGRAM(s) IV Intermittent every 24 hours  cefTRIAXone   IVPB 1 Gram(s) IV Intermittent every 24 hours  enoxaparin Injectable 40 milliGRAM(s) SubCutaneous daily  ergocalciferol 99692 Unit(s) Oral <User Schedule>  influenza   Vaccine 0.5 milliLiter(s) IntraMuscular once  pantoprazole    Tablet 40 milliGRAM(s) Oral before breakfast  rOPINIRole 0.5 milliGRAM(s) Oral daily    MEDICATIONS  (PRN):    LABS:                        11.8   8.5   )-----------( 208      ( 09 Sep 2018 06:42 )             37.7     09-09    141  |  108  |  14  ----------------------------<  91  3.5   |  25  |  0.89    Ca    8.6      09 Sep 2018 06:42    MICROBIOLOGY:   AFB- negative x 1  QUantiferon negative    RADIOLOGY:  [X] Reviewed and interpreted by me

## 2018-09-10 NOTE — PROGRESS NOTE ADULT - PROBLEM SELECTOR PLAN 1
presented with chronic cough and acute SOB/COLVIN  vs CHF?   Echo - grade 3 diastolic dysfunction, EF 50-55%, moderate AS  chest xray: right upper lobe and base pneumonia   CT chest - multifocal pna worse in RUL and RLL  P/E: diffuse rhonchi over right side   blood cx - negative  legionella and RVP negative  afb x 3 negative  f/u histoplasmosis  stat dose of rocphin and zithromax in ED  will continue with rocephin and zithromax  Pulm - Dr. Tellez presented with chronic cough and acute SOB/COLVIN  vs CHF?   Echo - grade 3 diastolic dysfunction, EF 50-55%, moderate AS  chest xray: right upper lobe and base pneumonia   CT chest - multifocal pna worse in RUL and RLL  P/E: diffuse rhonchi over right side   blood cx - negative  legionella and RVP negative  afb x 3 negative  f/u histoplasmosis  stat dose of rocphin and zithromax in ED  will continue with rocephin and zithromax  Pulm - Dr. Tellez  D/c planning  F/u PT eval

## 2018-09-10 NOTE — DISCHARGE NOTE ADULT - HOSPITAL COURSE
87F walks independent lives in daughter [place]ir[place]([phone]) in family house with PMH of restless leg syndrome (on Ropinirole), HTN (was on lisinopril which was discontinued likely 2/2 chronic dry cough), HLD presented with SOB and COLVIN for one day.  She reports having dry cough for last 6 months and lisinopril was likely stopped for same.    Patient had cxr and chest ct, both of which showed multifocal pna, worse in RUL and RLL. Patient was started on azithromycin and ceftriaxone. Quantiferon and AFB were sent and came back negative. Patient is medically clear for dc home, has been advised to finish abx and f/u with PCP within a week. 87F walks independent lives in daughter [place]irah([phone]) in family house with PMH of restless leg syndrome (on Ropinirole), HTN (was on lisinopril which was discontinued likely 2/2 chronic dry cough), HLD presented with SOB and COLVIN for one day.  She reports having dry cough for last 6 months and lisinopril was likely stopped for same.    Patient had cxr and chest ct, both of which showed multifocal pna, worse in RUL and RLL. Patient was started on azithromycin and ceftriaxone. Quantiferon and AFB were sent and came back negative. Patient is medically clear for dc home, has been advised to finish abx and f/u with PCP within a week. Patient stable for discharge as per attending. 87F walks independent lives in daughter [place]ir[place]([phone]) in family house with PMH of restless leg syndrome (on Ropinirole), HTN (was on lisinopril which was discontinued likely 2/2 chronic dry cough), HLD presented with SOB and COLVIN for one day.  She reports having dry cough for last 6 months and lisinopril was likely stopped for same.    Patient had cxr and chest ct, both of which showed multifocal pna, worse in RUL and RLL. Patient was started on azithromycin and ceftriaxone. Quantiferon and AFB were sent and came back negative. Patient is medically clear for dc home, has been advised to finish abx for 1 more day and f/u with PCP within a week. Patient stable for discharge as per attending.

## 2018-09-10 NOTE — PROGRESS NOTE ADULT - PROBLEM SELECTOR PLAN 2
was on lisinopril which was taken off 2/2 cough   will continue monitoring - add medication if needed  currently maintaining well

## 2018-09-10 NOTE — DISCHARGE NOTE ADULT - LAUNCH MEDICATION RECONCILIATION
Oriented - self; Oriented - place; Oriented - time
<<-----Click here for Discharge Medication Review

## 2018-09-10 NOTE — PROGRESS NOTE ADULT - ASSESSMENT
Impression:    1) Multilobar pneumonia- RUL, RLL pneumonia- DDx- Community acquired pneumonia, MTB, Atypical pneumonia  2) Chronic cough with night sweats and chills      Recommendations  Clinically stable. Afebrile, WBC normal.  HD # 4.  Can consider deescalating abx to oral  Quantiferon is negative which makes MTB less likely but not 100% in acute cases, would still try to obtain 1 more sputum to complete workup and then can follow up with me or her PCP with 4-6 weeks for the culture results along with repeat imaging to ensure resolution

## 2018-09-10 NOTE — PROGRESS NOTE ADULT - ATTENDING COMMENTS
Patient seen and examined; Agree with PGY1 A/P above with editing as needed. Discussed with PGY1    Pt feels well. No new complaints; No significant cough; Afebrile. Sputum for AFB x 3 came back negative; Discontinued Isolation. Son arrived at bedside; patient ambulate independently at baseline    Vital Signs Last 24 Hrs  T(C): 36.6 (10 Sep 2018 15:08), Max: 36.6 (09 Sep 2018 21:14)  T(F): 97.9 (10 Sep 2018 15:08), Max: 97.9 (09 Sep 2018 21:14)  HR: 62 (10 Sep 2018 15:08) (57 - 62)  BP: 142/79 (10 Sep 2018 15:08) (129/58 - 142/79)  RR: 18 (10 Sep 2018 15:08) (18 - 18)  SpO2: 96% (10 Sep 2018 15:08) (95% - 98%)    P/E: As above;  CVS: S1S2 present,   Resp: BLAE+, nO wheeze or rhonchi  GI: Soft, Bs+, NT, ND  Extr: No edema or calf tenderness B/L LE    Labs:                        12.0   7.7   )-----------( 244      ( 10 Sep 2018 08:44 )             37.6   09-10    145  |  109<H>  |  15  ----------------------------<  90  4.3   |  28  |  0.90    Ca    8.9      10 Sep 2018 08:44    Culture - Acid Fast - Sputum w/Smear . (09.10.18 @ 09:53)    Specimen Source: .Sputum Sputum    Acid Fast Bacilli Smear: No acid fast bacilli seen by fluorochrome stain  Culture - Acid Fast - Sputum w/Smear . (09.09.18 @ 15:39)    Specimen Source: .Sputum Sputum    Acid Fast Bacilli Smear:  vNo acid fast bacilli seen by fluorochrome stain  Culture - Acid Fast - Sputum w/Smear . (09.08.18 @ 09:52)    Specimen Source: .Sputum Sputum    Acid Fast Bacilli Smear: No acid fast bacilli seen by fluorochrome stain      D/D:  1.multifocal PNA: RuL/RLL infiltrate  clinically improved w/ resolved leukocytosis  CT chest suggest multifocal pna. elevated Procalcitonin supports infectious bacterial process  -ceftriaxone/azithromycin  -quantiferon gold negative  -supportive management w/ anti-tussive  -RVP/, urine legionella negative  AFB x 3 negative; D/C Isolation    2. ?pulm HTN  Discussed with Pulmonary Dr. Matute  Patient is not hypoxic, saturating well on RA; Will get Pulse Ox with ambulation with PT    3. vitamin D deficiency: reppace weekly for 4-6 weeks then repeat levels  Once more than 30, switch to daily 1000 units daily    4. HTN: controlled may benefit fro  low dsoe calcium channel blockers; was on ACEI in the past.  discontinued due to cough     5. Restless leg syndrome: Continue Ropirinole    6. HLD: Statin    PT evaluation  D/C Home tomorrow if stable  F/U Pulmonary Dr. Tellez in 2 weeks at office  Discussed with patient and Son at bedside findings and plan of care  Discussed with ANDERS Mock  Discussed with PGY1 Dr. Eng and PGy2 Dr. Serna Patient seen and examined; Agree with PGY1 A/P above with editing as needed. Discussed with PGY1    Pt feels well. No new complaints; No significant cough; Afebrile. Sputum for AFB x 3 came back negative; Discontinued Isolation. Son arrived at bedside; patient ambulate independently at baseline    Vital Signs Last 24 Hrs  T(C): 36.6 (10 Sep 2018 15:08), Max: 36.6 (09 Sep 2018 21:14)  T(F): 97.9 (10 Sep 2018 15:08), Max: 97.9 (09 Sep 2018 21:14)  HR: 62 (10 Sep 2018 15:08) (57 - 62)  BP: 142/79 (10 Sep 2018 15:08) (129/58 - 142/79)  RR: 18 (10 Sep 2018 15:08) (18 - 18)  SpO2: 96% (10 Sep 2018 15:08) (95% - 98%)    P/E: As above;  CVS: S1S2 present,   Resp: BLAE+, nO wheeze or rhonchi  GI: Soft, Bs+, NT, ND  Extr: No edema or calf tenderness B/L LE    Labs:                        12.0   7.7   )-----------( 244      ( 10 Sep 2018 08:44 )             37.6   09-10    145  |  109<H>  |  15  ----------------------------<  90  4.3   |  28  |  0.90    Ca    8.9      10 Sep 2018 08:44    Culture - Acid Fast - Sputum w/Smear . (09.10.18 @ 09:53)    Specimen Source: .Sputum Sputum    Acid Fast Bacilli Smear: No acid fast bacilli seen by fluorochrome stain  Culture - Acid Fast - Sputum w/Smear . (09.09.18 @ 15:39)    Specimen Source: .Sputum Sputum    Acid Fast Bacilli Smear:  vNo acid fast bacilli seen by fluorochrome stain  Culture - Acid Fast - Sputum w/Smear . (09.08.18 @ 09:52)    Specimen Source: .Sputum Sputum    Acid Fast Bacilli Smear: No acid fast bacilli seen by fluorochrome stain      D/D:  1.multifocal PNA: RuL/RLL infiltrate  clinically improved w/ resolved leukocytosis  CT chest suggest multifocal pna. elevated Procalcitonin supports infectious bacterial process  -ceftriaxone/azithromycin  -quantiferon gold negative  -supportive management w/ anti-tussive  -RVP/, urine legionella negative  AFB x 3 negative; D/C Isolation    2. ?pulm HTN  Discussed with Pulmonary Dr. Matute  Patient is not hypoxic, saturating well on RA; Will get Pulse Ox with ambulation with PT    3. vitamin D deficiency: reppace weekly for 4-6 weeks then repeat levels  Once more than 30, switch to daily 1000 units daily    4. HTN: controlled may benefit fro  low dsoe calcium channel blockers; was on ACEI in the past.  discontinued due to cough     5. Restless leg syndrome: Continue Ropirinole    6. HLD: Statin    PT evaluation  D/C Home tomorrow if stable  F/U Pulmonary Dr. Tellez in 2 weeks at office  Discussed with patient and Son at bedside findings and plan of care  Discussed with ANDERS Mock  Discussed with PGY1 Dr. Diego and PGy2 Dr. Serna    Labs stable; No need to repeat daily. Attending Medicine Covering Dr. Barcenas    Patient seen and examined; Agree with PGY1 A/P above with editing as needed. Discussed with PGY1    Pt feels well. No new complaints; No significant cough; Afebrile. Sputum for AFB x 3 came back negative; Discontinued Isolation. Son arrived at bedside; patient ambulate independently at baseline    Vital Signs Last 24 Hrs  T(C): 36.6 (10 Sep 2018 15:08), Max: 36.6 (09 Sep 2018 21:14)  T(F): 97.9 (10 Sep 2018 15:08), Max: 97.9 (09 Sep 2018 21:14)  HR: 62 (10 Sep 2018 15:08) (57 - 62)  BP: 142/79 (10 Sep 2018 15:08) (129/58 - 142/79)  RR: 18 (10 Sep 2018 15:08) (18 - 18)  SpO2: 96% (10 Sep 2018 15:08) (95% - 98%)    P/E: As above;  CVS: S1S2 present,   Resp: BLAE+, nO wheeze or rhonchi  GI: Soft, Bs+, NT, ND  Extr: No edema or calf tenderness B/L LE    Labs:                        12.0   7.7   )-----------( 244      ( 10 Sep 2018 08:44 )             37.6   09-10    145  |  109<H>  |  15  ----------------------------<  90  4.3   |  28  |  0.90    Ca    8.9      10 Sep 2018 08:44    Culture - Acid Fast - Sputum w/Smear . (09.10.18 @ 09:53)    Specimen Source: .Sputum Sputum    Acid Fast Bacilli Smear: No acid fast bacilli seen by fluorochrome stain  Culture - Acid Fast - Sputum w/Smear . (09.09.18 @ 15:39)    Specimen Source: .Sputum Sputum    Acid Fast Bacilli Smear:  vNo acid fast bacilli seen by fluorochrome stain  Culture - Acid Fast - Sputum w/Smear . (09.08.18 @ 09:52)    Specimen Source: .Sputum Sputum    Acid Fast Bacilli Smear: No acid fast bacilli seen by fluorochrome stain      D/D:  1.multifocal PNA: RuL/RLL infiltrate  clinically improved w/ resolved leukocytosis  CT chest suggest multifocal pna. elevated Procalcitonin supports infectious bacterial process  -ceftriaxone/azithromycin  -quantiferon gold negative  -supportive management w/ anti-tussive  -RVP/, urine legionella negative  AFB x 3 negative; D/C Isolation    2. Pulm HTN  Discussed with Pulmonary Dr. Tellez  Patient is not hypoxic, saturating well on RA; Will get Pulse Ox with ambulation with PT    3. Vitamin D deficiency: replace weekly for 4-6 weeks then repeat levels  Once more than 30, switch to daily 1000 units daily    4. HTN: controlled may benefit fro  low dose calcium channel blockers; was on ACEI in the past.  discontinued due to cough     5. Restless leg syndrome: Continue Ropinirole    6. HLD: Statin    PT evaluation  D/C Home tomorrow if stable  F/U Pulmonary Dr. Tellez in 2 weeks at office  Discussed with patient and Son at bedside findings and plan of care  Discussed with ANDERS Mock  Discussed with PGY1 Dr. Diego and PGy2 Dr. Serna    Labs stable; No need to repeat daily.

## 2018-09-11 VITALS — OXYGEN SATURATION: 96 % | HEART RATE: 61 BPM | DIASTOLIC BLOOD PRESSURE: 69 MMHG | SYSTOLIC BLOOD PRESSURE: 147 MMHG

## 2018-09-11 LAB
CULTURE RESULTS: SIGNIFICANT CHANGE UP
CULTURE RESULTS: SIGNIFICANT CHANGE UP
H CAPSUL AG SPEC-ACNC: SIGNIFICANT CHANGE UP
H CAPSUL AG UR QL IA: SIGNIFICANT CHANGE UP
SPECIMEN SOURCE: SIGNIFICANT CHANGE UP
SPECIMEN SOURCE: SIGNIFICANT CHANGE UP

## 2018-09-11 PROCEDURE — 84100 ASSAY OF PHOSPHORUS: CPT

## 2018-09-11 PROCEDURE — 87633 RESP VIRUS 12-25 TARGETS: CPT

## 2018-09-11 PROCEDURE — 87385 HISTOPLASMA CAPSUL AG IA: CPT

## 2018-09-11 PROCEDURE — 84145 PROCALCITONIN (PCT): CPT

## 2018-09-11 PROCEDURE — 87581 M.PNEUMON DNA AMP PROBE: CPT

## 2018-09-11 PROCEDURE — 87206 SMEAR FLUORESCENT/ACID STAI: CPT

## 2018-09-11 PROCEDURE — 99285 EMERGENCY DEPT VISIT HI MDM: CPT | Mod: 25

## 2018-09-11 PROCEDURE — 82962 GLUCOSE BLOOD TEST: CPT

## 2018-09-11 PROCEDURE — 85027 COMPLETE CBC AUTOMATED: CPT

## 2018-09-11 PROCEDURE — 82306 VITAMIN D 25 HYDROXY: CPT

## 2018-09-11 PROCEDURE — 83605 ASSAY OF LACTIC ACID: CPT

## 2018-09-11 PROCEDURE — 87015 SPECIMEN INFECT AGNT CONCNTJ: CPT

## 2018-09-11 PROCEDURE — 93306 TTE W/DOPPLER COMPLETE: CPT

## 2018-09-11 PROCEDURE — 87116 MYCOBACTERIA CULTURE: CPT

## 2018-09-11 PROCEDURE — 87486 CHLMYD PNEUM DNA AMP PROBE: CPT

## 2018-09-11 PROCEDURE — 99239 HOSP IP/OBS DSCHRG MGMT >30: CPT

## 2018-09-11 PROCEDURE — 86480 TB TEST CELL IMMUN MEASURE: CPT

## 2018-09-11 PROCEDURE — 71045 X-RAY EXAM CHEST 1 VIEW: CPT

## 2018-09-11 PROCEDURE — 87798 DETECT AGENT NOS DNA AMP: CPT

## 2018-09-11 PROCEDURE — 83036 HEMOGLOBIN GLYCOSYLATED A1C: CPT

## 2018-09-11 PROCEDURE — 84484 ASSAY OF TROPONIN QUANT: CPT

## 2018-09-11 PROCEDURE — 80061 LIPID PANEL: CPT

## 2018-09-11 PROCEDURE — 87040 BLOOD CULTURE FOR BACTERIA: CPT

## 2018-09-11 PROCEDURE — 71250 CT THORAX DX C-: CPT

## 2018-09-11 PROCEDURE — 90686 IIV4 VACC NO PRSV 0.5 ML IM: CPT

## 2018-09-11 PROCEDURE — 82607 VITAMIN B-12: CPT

## 2018-09-11 PROCEDURE — 87449 NOS EACH ORGANISM AG IA: CPT

## 2018-09-11 PROCEDURE — 94640 AIRWAY INHALATION TREATMENT: CPT

## 2018-09-11 PROCEDURE — 83735 ASSAY OF MAGNESIUM: CPT

## 2018-09-11 PROCEDURE — 93005 ELECTROCARDIOGRAM TRACING: CPT

## 2018-09-11 PROCEDURE — 82746 ASSAY OF FOLIC ACID SERUM: CPT

## 2018-09-11 PROCEDURE — 80048 BASIC METABOLIC PNL TOTAL CA: CPT

## 2018-09-11 PROCEDURE — 84443 ASSAY THYROID STIM HORMONE: CPT

## 2018-09-11 PROCEDURE — 83880 ASSAY OF NATRIURETIC PEPTIDE: CPT

## 2018-09-11 RX ORDER — AZITHROMYCIN 500 MG/1
1 TABLET, FILM COATED ORAL
Qty: 1 | Refills: 0 | OUTPATIENT
Start: 2018-09-11 | End: 2018-09-11

## 2018-09-11 RX ORDER — CEFUROXIME AXETIL 250 MG
1 TABLET ORAL
Qty: 2 | Refills: 0 | OUTPATIENT
Start: 2018-09-11 | End: 2018-09-11

## 2018-09-11 RX ADMIN — ENOXAPARIN SODIUM 40 MILLIGRAM(S): 100 INJECTION SUBCUTANEOUS at 11:39

## 2018-09-11 RX ADMIN — ROPINIROLE 0.5 MILLIGRAM(S): 8 TABLET, FILM COATED, EXTENDED RELEASE ORAL at 11:39

## 2018-09-11 RX ADMIN — AZITHROMYCIN 250 MILLIGRAM(S): 500 TABLET, FILM COATED ORAL at 05:35

## 2018-09-11 RX ADMIN — PANTOPRAZOLE SODIUM 40 MILLIGRAM(S): 20 TABLET, DELAYED RELEASE ORAL at 06:55

## 2018-09-11 RX ADMIN — CEFTRIAXONE 100 GRAM(S): 500 INJECTION, POWDER, FOR SOLUTION INTRAMUSCULAR; INTRAVENOUS at 06:55

## 2018-09-11 RX ADMIN — INFLUENZA VIRUS VACCINE 0.5 MILLILITER(S): 15; 15; 15; 15 SUSPENSION INTRAMUSCULAR at 16:13

## 2018-09-11 NOTE — PROGRESS NOTE ADULT - PROBLEM SELECTOR PLAN 1
presented with chronic cough and acute SOB/COLVIN  vs CHF?   Echo - grade 3 diastolic dysfunction, EF 50-55%, moderate AS  chest xray: right upper lobe and base pneumonia   CT chest - multifocal pna worse in RUL and RLL  rales over the right lung are improving.   blood cx - negative  legionella and RVP negative  afb x 3 negative  f/u histoplasmosis  stat dose of rocphin and zithromax in ED  will continue with rocephin and zithromax  Pulm - Dr. Tellez  D/c planning  F/u PT eval

## 2018-09-11 NOTE — PROGRESS NOTE ADULT - ATTENDING COMMENTS
Patient seen and examined.     Pt feels well. No new complaints; No significant cough; Afebrile. Sputum for AFB x 3 came back negative; Discontinued Isolation. Patient ambulate independently at baseline    P/E: As above;  CVS: S1S2 present,   Resp: BLAE+, nO wheeze or rhonchi  GI: Soft, Bs+, NT, ND  Extr: No edema or calf tenderness B/L LE      Culture - Acid Fast - Sputum w/Smear . (09.10.18 @ 09:53)    Specimen Source: .Sputum Sputum    Acid Fast Bacilli Smear: No acid fast bacilli seen by fluorochrome stain  Culture - Acid Fast - Sputum w/Smear . (09.09.18 @ 15:39)    Specimen Source: .Sputum Sputum    Acid Fast Bacilli Smear:  vNo acid fast bacilli seen by fluorochrome stain  Culture - Acid Fast - Sputum w/Smear . (09.08.18 @ 09:52)    Specimen Source: .Sputum Sputum    Acid Fast Bacilli Smear: No acid fast bacilli seen by fluorochrome stain      D/D:  1.multifocal PNA: RuL/RLL infiltrate  clinically improved w/ resolved leukocytosis  CT chest suggest multifocal pna. elevated Procalcitonin supports infectious bacterial process  -ceftriaxone/azithromycin  -quantiferon gold negative  Clinically improved   -RVP/, urine legionella negative  AFB x 3 negative; D/C Isolation    2. Pulm HTN  Discussed with Pulmonary Dr. Tellez  Patient is not hypoxic, saturating well on RA; Will get Pulse Ox with ambulation with PT    3. Vitamin D deficiency: replace weekly for 4-6 weeks then repeat levels  Once more than 30, switch to daily 1000 units daily    4. HTN: controlled may benefit fro  low dose calcium channel blockers; was on ACEI in the past.  discontinued due to cough     5. Restless leg syndrome: Continue Ropinirole    6. HLD: Statin    PT evaluation, seen and appreciated  D/C Home   F/U Pulmonary Dr. Tellez in 2 weeks at office

## 2018-09-11 NOTE — PROGRESS NOTE ADULT - SUBJECTIVE AND OBJECTIVE BOX
MEDICAL ATTENDING NOTE  Patient is a 87y old  Female who presents with a chief complaint of cough for last 6 months (10 Sep 2018 14:22)      HPI:  87F walks independent lives in daughter Amirah([phone]) in family house with PMH of restless leg syndrome (on Ropinirole), HTN (was on lisinopril which was discontinued likely 2/2 chronic dry cough), HLD presented with SOB and COLVIN started this morning. Patient was fine last night but she woke up around 5 as she was not feeling good. When she tried to get up form bed she started getting SOB, so she went to her daughter. When daughter saw her she was gasping for air so she called ambulance. As per daughter by the time ambulance came her respiration significantly improved. She reports having dry cough for last 6 months and lisinopril was likely stopped for same. Denies chest pain, abdominal pain, palpitations, dizziness, N/V/D, headache. (06 Sep 2018 10:59)      INTERVAL HPI/OVERNIGHT EVENTS: patient is feeling much better    MEDICATIONS  (STANDING):  atorvastatin 10 milliGRAM(s) Oral at bedtime  azithromycin  IVPB 500 milliGRAM(s) IV Intermittent every 24 hours  cefTRIAXone   IVPB 1 Gram(s) IV Intermittent every 24 hours  enoxaparin Injectable 40 milliGRAM(s) SubCutaneous daily  ergocalciferol 33045 Unit(s) Oral <User Schedule>  pantoprazole    Tablet 40 milliGRAM(s) Oral before breakfast  rOPINIRole 0.5 milliGRAM(s) Oral daily    MEDICATIONS  (PRN):      __________________________________________________  REVIEW OF SYSTEMS:    CONSTITUTIONAL: No fever,   EYES: no acute visual disturbances  NECK: No pain or stiffness  RESPIRATORY: No cough; No shortness of breath  CARDIOVASCULAR: No chest pain, no palpitations  GASTROINTESTINAL: No pain. No nausea or vomiting; No diarrhea   NEUROLOGICAL: No headache or numbness, no tremors  MUSCULOSKELETAL: No joint pain, no muscle pain  GENITOURINARY: no dysuria, no frequency, no hesitancy  PSYCHIATRY: no depression , no anxiety  ALL OTHER  ROS negative        Vital Signs Last 24 Hrs  T(C): 36.4 (11 Sep 2018 13:42), Max: 36.9 (11 Sep 2018 05:07)  T(F): 97.6 (11 Sep 2018 13:42), Max: 98.4 (11 Sep 2018 05:07)  HR: 61 (11 Sep 2018 15:30) (61 - 64)  BP: 147/69 (11 Sep 2018 15:30) (113/50 - 147/69)  BP(mean): --  RR: 16 (11 Sep 2018 13:42) (16 - 17)  SpO2: 96% (11 Sep 2018 15:30) (95% - 98%)    ________________________________________________  PHYSICAL EXAM:  GENERAL: NAD  HEENT: Normocephalic;  conjunctivae and sclerae clear; moist mucous membranes;   NECK : supple  CHEST/LUNG: Clear to auscultation bilaterally with good air entry   HEART: S1 S2  regular; no murmurs, gallops or rubs  ABDOMEN: Soft, Nontender, Nondistended; Bowel sounds present  EXTREMITIES: no cyanosis; no edema; no calf tenderness  SKIN: warm and dry; no rash  NERVOUS SYSTEM:  Awake and alert; Oriented  to place, person and time ; no new deficits    _________________________________________________  LABS:                        12.0   7.7   )-----------( 244      ( 10 Sep 2018 08:44 )             37.6     09-10    145  |  109<H>  |  15  ----------------------------<  90  4.3   |  28  |  0.90    Ca    8.9      10 Sep 2018 08:44          CAPILLARY BLOOD GLUCOSE MEDICAL ATTENDING NOTE  Patient is a 87y old  Female who presents with a chief complaint of cough for last 6 months (10 Sep 2018 14:22)      HPI:  87F walks independent lives in daughter Amirah([phone]) in family house with PMH of restless leg syndrome (on Ropinirole), HTN (was on lisinopril which was discontinued likely 2/2 chronic dry cough), HLD presented with SOB and COLVIN started this morning. Patient was fine last night but she woke up around 5 as she was not feeling good. When she tried to get up form bed she started getting SOB, so she went to her daughter. When daughter saw her she was gasping for air so she called ambulance. As per daughter by the time ambulance came her respiration significantly improved. She reports having dry cough for last 6 months and lisinopril was likely stopped for same. Denies chest pain, abdominal pain, palpitations, dizziness, N/V/D, headache. (06 Sep 2018 10:59)      INTERVAL HPI/OVERNIGHT EVENTS: patient is feeling much better    MEDICATIONS  (STANDING):  atorvastatin 10 milliGRAM(s) Oral at bedtime  azithromycin  IVPB 500 milliGRAM(s) IV Intermittent every 24 hours  cefTRIAXone   IVPB 1 Gram(s) IV Intermittent every 24 hours  enoxaparin Injectable 40 milliGRAM(s) SubCutaneous daily  ergocalciferol 63572 Unit(s) Oral <User Schedule>  pantoprazole    Tablet 40 milliGRAM(s) Oral before breakfast  rOPINIRole 0.5 milliGRAM(s) Oral daily    MEDICATIONS  (PRN):      __________________________________________________  REVIEW OF SYSTEMS:    CONSTITUTIONAL: No fever,   EYES: no acute visual disturbances  NECK: No pain or stiffness  RESPIRATORY: No cough; No shortness of breath  CARDIOVASCULAR: No chest pain, no palpitations  GASTROINTESTINAL: No pain. No nausea or vomiting; No diarrhea   NEUROLOGICAL: No headache or numbness, no tremors  MUSCULOSKELETAL: No joint pain, no muscle pain  GENITOURINARY: no dysuria, no frequency, no hesitancy  PSYCHIATRY: no depression , no anxiety  ALL OTHER  ROS negative        Vital Signs Last 24 Hrs  T(C): 36.4 (11 Sep 2018 13:42), Max: 36.9 (11 Sep 2018 05:07)  T(F): 97.6 (11 Sep 2018 13:42), Max: 98.4 (11 Sep 2018 05:07)  HR: 61 (11 Sep 2018 15:30) (61 - 64)  BP: 147/69 (11 Sep 2018 15:30) (113/50 - 147/69)  BP(mean): --  RR: 16 (11 Sep 2018 13:42) (16 - 17)  SpO2: 96% (11 Sep 2018 15:30) (95% - 98%)    ________________________________________________  PHYSICAL EXAM:  GENERAL: NAD  HEENT: Normocephalic;  conjunctivae and sclerae clear; moist mucous membranes;   NECK : supple  CHEST/LUNG: Rales are improved.   HEART: S1 S2  regular; no murmurs, gallops or rubs  ABDOMEN: Soft, Nontender, Nondistended; Bowel sounds present  EXTREMITIES: no cyanosis; no edema; no calf tenderness  SKIN: warm and dry; no rash  NERVOUS SYSTEM:  Awake and alert; Oriented  to place, person and time ; no new deficits    _________________________________________________  LABS:                        12.0   7.7   )-----------( 244      ( 10 Sep 2018 08:44 )             37.6     09-10    145  |  109<H>  |  15  ----------------------------<  90  4.3   |  28  |  0.90    Ca    8.9      10 Sep 2018 08:44          CAPILLARY BLOOD GLUCOSE

## 2018-09-11 NOTE — PROGRESS NOTE ADULT - REASON FOR ADMISSION
cough for last 6 months

## 2018-09-11 NOTE — PROGRESS NOTE ADULT - ASSESSMENT
87F walks independent lives in daughter Amirah([phone]) in family house with PMH of restless leg syndrome (on Ropinirole), HTN (was on lisinopril which was discontinued likely 2/2 chronic dry cough), HLD presented with SOB and COLVIN of one day.

## 2018-09-20 NOTE — CHART NOTE - NSCHARTNOTEFT_GEN_A_CORE
I received a call from the laboratory that a sputum collected on this patient during her hospitalization is growing AFB in broth.  It has not yet been identified yet.    I contacted the patient at home, and she is feeling well.  I have also contacted her PMD, who saw her on Saturday, and found her clinically well.    Since the organism is already growing in the broth, it is likely a rapid grower, and not M. tuberculosis.    I will follow up on the identification of the organism and share the information with Dr. Polanco.

## 2018-09-20 NOTE — CHART NOTE - NSCHARTNOTEFT_GEN_A_CORE
I have been contacted by laboratory that a sputum specimen is growing AFB in the laboratory.  Identification of the organism is not yet available.   I have called the patient at home.  She is feeling fine clinically.  I have also contacted her PMD, who saw her last Saturday.  The patient is clinically well.   The AFB is likely to be a rapid grower, and not M. tuberculosis.  Identification of the organism is pending.    I will follow up with the identification and communicate with the patient and her PMD, Dr. Polanco. 40.1

## 2018-09-25 NOTE — CHART NOTE - NSCHARTNOTEFT_GEN_A_CORE
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     I will apprise patient's PMD, Dr. Polanco of the identification.

## 2018-10-31 LAB
CULTURE RESULTS: SIGNIFICANT CHANGE UP
CULTURE RESULTS: SIGNIFICANT CHANGE UP
SPECIMEN SOURCE: SIGNIFICANT CHANGE UP
SPECIMEN SOURCE: SIGNIFICANT CHANGE UP

## 2018-11-20 LAB
CULTURE RESULTS: SIGNIFICANT CHANGE UP
SPECIMEN SOURCE: SIGNIFICANT CHANGE UP

## 2019-02-28 NOTE — ED PROVIDER NOTE - MEDICAL DECISION MAKING DETAILS
generalized
85 y/o F pt presents with vaginal itching. Will send urine culture and d/c home with Estrace.

## 2021-10-13 ENCOUNTER — EMERGENCY (EMERGENCY)
Facility: HOSPITAL | Age: 86
LOS: 1 days | Discharge: HOME | End: 2021-10-13
Attending: EMERGENCY MEDICINE
Payer: COMMERCIAL

## 2021-10-13 VITALS
HEART RATE: 51 BPM | OXYGEN SATURATION: 97 % | RESPIRATION RATE: 18 BRPM | SYSTOLIC BLOOD PRESSURE: 152 MMHG | DIASTOLIC BLOOD PRESSURE: 69 MMHG

## 2021-10-13 VITALS
DIASTOLIC BLOOD PRESSURE: 76 MMHG | OXYGEN SATURATION: 95 % | WEIGHT: 149.91 LBS | HEART RATE: 59 BPM | HEIGHT: 56 IN | RESPIRATION RATE: 16 BRPM | TEMPERATURE: 98 F | SYSTOLIC BLOOD PRESSURE: 198 MMHG

## 2021-10-13 DIAGNOSIS — M75.121 COMPLETE ROTATOR CUFF TEAR OR RUPTURE OF RIGHT SHOULDER, NOT SPECIFIED AS TRAUMATIC: Chronic | ICD-10-CM

## 2021-10-13 DIAGNOSIS — Z90.710 ACQUIRED ABSENCE OF BOTH CERVIX AND UTERUS: Chronic | ICD-10-CM

## 2021-10-13 PROBLEM — G25.81 RESTLESS LEGS SYNDROME: Chronic | Status: ACTIVE | Noted: 2018-09-06

## 2021-10-13 LAB
ALBUMIN SERPL ELPH-MCNC: 3.3 G/DL — LOW (ref 3.5–5)
ALP SERPL-CCNC: 71 U/L — SIGNIFICANT CHANGE UP (ref 40–120)
ALT FLD-CCNC: 19 U/L DA — SIGNIFICANT CHANGE UP (ref 10–60)
ANION GAP SERPL CALC-SCNC: 9 MMOL/L — SIGNIFICANT CHANGE UP (ref 5–17)
AST SERPL-CCNC: 14 U/L — SIGNIFICANT CHANGE UP (ref 10–40)
BASOPHILS # BLD AUTO: 0.06 K/UL — SIGNIFICANT CHANGE UP (ref 0–0.2)
BASOPHILS NFR BLD AUTO: 1.1 % — SIGNIFICANT CHANGE UP (ref 0–2)
BILIRUB SERPL-MCNC: 0.4 MG/DL — SIGNIFICANT CHANGE UP (ref 0.2–1.2)
BUN SERPL-MCNC: 26 MG/DL — HIGH (ref 7–18)
CALCIUM SERPL-MCNC: 9 MG/DL — SIGNIFICANT CHANGE UP (ref 8.4–10.5)
CHLORIDE SERPL-SCNC: 111 MMOL/L — HIGH (ref 96–108)
CO2 SERPL-SCNC: 23 MMOL/L — SIGNIFICANT CHANGE UP (ref 22–31)
CREAT SERPL-MCNC: 1.08 MG/DL — SIGNIFICANT CHANGE UP (ref 0.5–1.3)
EOSINOPHIL # BLD AUTO: 0.38 K/UL — SIGNIFICANT CHANGE UP (ref 0–0.5)
EOSINOPHIL NFR BLD AUTO: 7.3 % — HIGH (ref 0–6)
GLUCOSE SERPL-MCNC: 97 MG/DL — SIGNIFICANT CHANGE UP (ref 70–99)
HCT VFR BLD CALC: 36.7 % — SIGNIFICANT CHANGE UP (ref 34.5–45)
HGB BLD-MCNC: 11.9 G/DL — SIGNIFICANT CHANGE UP (ref 11.5–15.5)
IMM GRANULOCYTES NFR BLD AUTO: 0.4 % — SIGNIFICANT CHANGE UP (ref 0–1.5)
LYMPHOCYTES # BLD AUTO: 1.98 K/UL — SIGNIFICANT CHANGE UP (ref 1–3.3)
LYMPHOCYTES # BLD AUTO: 37.9 % — SIGNIFICANT CHANGE UP (ref 13–44)
MCHC RBC-ENTMCNC: 30.1 PG — SIGNIFICANT CHANGE UP (ref 27–34)
MCHC RBC-ENTMCNC: 32.4 GM/DL — SIGNIFICANT CHANGE UP (ref 32–36)
MCV RBC AUTO: 92.9 FL — SIGNIFICANT CHANGE UP (ref 80–100)
MONOCYTES # BLD AUTO: 0.39 K/UL — SIGNIFICANT CHANGE UP (ref 0–0.9)
MONOCYTES NFR BLD AUTO: 7.5 % — SIGNIFICANT CHANGE UP (ref 2–14)
NEUTROPHILS # BLD AUTO: 2.4 K/UL — SIGNIFICANT CHANGE UP (ref 1.8–7.4)
NEUTROPHILS NFR BLD AUTO: 45.8 % — SIGNIFICANT CHANGE UP (ref 43–77)
NRBC # BLD: 0 /100 WBCS — SIGNIFICANT CHANGE UP (ref 0–0)
PLATELET # BLD AUTO: 218 K/UL — SIGNIFICANT CHANGE UP (ref 150–400)
POTASSIUM SERPL-MCNC: 4.4 MMOL/L — SIGNIFICANT CHANGE UP (ref 3.5–5.3)
POTASSIUM SERPL-SCNC: 4.4 MMOL/L — SIGNIFICANT CHANGE UP (ref 3.5–5.3)
PROT SERPL-MCNC: 7.3 G/DL — SIGNIFICANT CHANGE UP (ref 6–8.3)
RBC # BLD: 3.95 M/UL — SIGNIFICANT CHANGE UP (ref 3.8–5.2)
RBC # FLD: 13.2 % — SIGNIFICANT CHANGE UP (ref 10.3–14.5)
SODIUM SERPL-SCNC: 143 MMOL/L — SIGNIFICANT CHANGE UP (ref 135–145)
WBC # BLD: 5.23 K/UL — SIGNIFICANT CHANGE UP (ref 3.8–10.5)
WBC # FLD AUTO: 5.23 K/UL — SIGNIFICANT CHANGE UP (ref 3.8–10.5)

## 2021-10-13 PROCEDURE — 93971 EXTREMITY STUDY: CPT | Mod: 26,RT

## 2021-10-13 PROCEDURE — 99284 EMERGENCY DEPT VISIT MOD MDM: CPT | Mod: 25

## 2021-10-13 PROCEDURE — 80053 COMPREHEN METABOLIC PANEL: CPT

## 2021-10-13 PROCEDURE — 93971 EXTREMITY STUDY: CPT

## 2021-10-13 PROCEDURE — 85025 COMPLETE CBC W/AUTO DIFF WBC: CPT

## 2021-10-13 PROCEDURE — 36415 COLL VENOUS BLD VENIPUNCTURE: CPT

## 2021-10-13 PROCEDURE — 99285 EMERGENCY DEPT VISIT HI MDM: CPT

## 2021-10-13 RX ORDER — ACETAMINOPHEN 500 MG
650 TABLET ORAL ONCE
Refills: 0 | Status: COMPLETED | OUTPATIENT
Start: 2021-10-13 | End: 2021-10-13

## 2021-10-13 RX ADMIN — Medication 650 MILLIGRAM(S): at 08:44

## 2021-10-13 NOTE — ED PROVIDER NOTE - PATIENT PORTAL LINK FT
You can access the FollowMyHealth Patient Portal offered by [facility] by registering at the following website: [email or web address]. By joining [employer]’s FollowMyHealth portal, you will also be able to view your health information using other applications (apps) compatible with our system.

## 2021-10-13 NOTE — ED PROVIDER NOTE - CLINICAL SUMMARY MEDICAL DECISION MAKING FREE TEXT BOX
89 y/o woman, h/o HTN, restless leg syndrome, c/o right upper and lower posterior leg pain x about 3 weeks, gradually worsening--basic labs, Doppler r/o RLE DVT, Tylenol, reassess.

## 2021-10-13 NOTE — ED PROVIDER NOTE - NSFOLLOWUPINSTRUCTIONS_ED_ALL_ED_FT
LEG PAIN - AfterCare(R) Instructions(ER/ED)           Leg Pain    WHAT YOU NEED TO KNOW:    Leg pain may be caused by a variety of health conditions. Your tests did not show any broken bones or blood clots.    DISCHARGE INSTRUCTIONS:    Return to the emergency department if:   •You have a fever.      •Your leg starts to swell.      •Your leg pain gets worse.      •You have numbness or tingling in your leg or toes.      •You cannot put any weight on or move your leg.      Contact your healthcare provider if:   •Your pain does not decrease, even after treatment.      •You have questions or concerns about your condition or care.      Medicines:   •NSAIDs, such as ibuprofen, help decrease swelling, pain, and fever. This medicine is available with or without a doctor's order. NSAIDs can cause stomach bleeding or kidney problems in certain people. If you take blood thinner medicine, always ask your healthcare provider if NSAIDs are safe for you. Always read the medicine label and follow directions.      •Take your medicine as directed. Contact your healthcare provider if you think your medicine is not helping or if you have side effects. Tell him of her if you are allergic to any medicine. Keep a list of the medicines, vitamins, and herbs you take. Include the amounts, and when and why you take them. Bring the list or the pill bottles to follow-up visits. Carry your medicine list with you in case of an emergency.      Follow up with your healthcare provider as directed: You may need more tests to find the cause of your leg pain. You may need to see an orthopedic specialist or a physical therapist. Write down your questions so you remember to ask them during your visits.    Manage your leg pain:   •Rest your injured leg so that it can heal. You may need an immobilizer, brace, or splint to limit the movement of your leg. You may need to avoid putting any weight on your leg for at least 48 hours. Return to normal activities as directed.      •Ice the injury for 20 minutes every 4 hours for up to 24 hours, or as directed. Use an ice pack, or put crushed ice in a plastic bag. Cover it with a towel to protect your skin. Ice helps prevent tissue damage and decreases swelling and pain.      •Elevate your injured leg above the level of your heart as often as you can. This will help decrease swelling and pain. If possible, prop your leg on pillows or blankets to keep the area elevated comfortably.       •Use assistive devices as directed. You may need to use a cane or crutches. Assistive devices help decrease pain and pressure on your leg when you walk. Ask your healthcare provider for more information about assistive devices and how to use them correctly.      •Maintain a healthy weight. Extra body weight can cause pressure and pain in your hip, knee, and ankle joints. Ask your healthcare provider how much you should weigh. Ask him to help you create a weight loss plan if you are overweight.         © Copyright [employer] 2021           back to top                          © Copyright [employer] 2021

## 2021-10-13 NOTE — ED PROVIDER NOTE - MUSCULOSKELETAL, MLM
RLE:  nontender, no swelling, pulses 2+ throughout including posterior tibialis and dorsalis pedis, normal tactile temp, range of motion is not limited, no muscle or joint tenderness

## 2021-10-13 NOTE — ED ADULT NURSE NOTE - OBJECTIVE STATEMENT
Pt. c/o right leg pain that is on going for over two weeks but got worse over the last two days. Pt. denies any injury or recently falling.

## 2021-10-13 NOTE — ED PROVIDER NOTE - OBJECTIVE STATEMENT
89 y/o woman, h/o HTN, restless leg syndrome, c/o right upper and lower posterior leg pain x about 3 weeks, gradually worsening.  No recent injury, no fever/chills/swelling/skin changes/back pain.  She does report mild right leg weakness for about 3 weeks as well.  No h/o any known spinal pathology, DVT, or PE.  She saw her PMD recently and was started on a medication for this but cannot recall which med, and says it has not helped.

## 2023-09-08 ENCOUNTER — APPOINTMENT (OUTPATIENT)
Dept: GASTROENTEROLOGY | Facility: CLINIC | Age: 88
End: 2023-09-08

## 2024-02-14 NOTE — PATIENT PROFILE ADULT. - COMFORT LEVEL, ACCEPTABLE
bilateral UE Active ROM was WFL  (within functional limits) 3 Muscle strength UE's: WFL's. Fine motor skills: WFL's/bilateral UE Active ROM was WFL  (within functional limits)

## 2025-05-23 NOTE — ED ADULT NURSE NOTE - CHPI ED NUR TIMING2
Spoke to patient she is feeling better now.  No fever  Site is ok no warmth or red.  No cardiac symptoms,  just tender around site  She has questions about taking off the compressor?    Please advise   sudden onset
